# Patient Record
Sex: FEMALE | HISPANIC OR LATINO | Employment: FULL TIME | URBAN - METROPOLITAN AREA
[De-identification: names, ages, dates, MRNs, and addresses within clinical notes are randomized per-mention and may not be internally consistent; named-entity substitution may affect disease eponyms.]

---

## 2022-07-21 ENCOUNTER — HOSPITAL ENCOUNTER (EMERGENCY)
Facility: HOSPITAL | Age: 42
Discharge: HOME | End: 2022-07-22
Attending: EMERGENCY MEDICINE | Admitting: EMERGENCY MEDICINE

## 2022-07-21 VITALS
RESPIRATION RATE: 15 BRPM | DIASTOLIC BLOOD PRESSURE: 79 MMHG | WEIGHT: 208.11 LBS | HEART RATE: 80 BPM | OXYGEN SATURATION: 100 % | TEMPERATURE: 99 F | SYSTOLIC BLOOD PRESSURE: 124 MMHG

## 2022-07-21 DIAGNOSIS — N89.8 VAGINAL DISCHARGE: ICD-10-CM

## 2022-07-21 DIAGNOSIS — D64.9 ANEMIA: Primary | ICD-10-CM

## 2022-07-21 LAB
ALBUMIN SERPL BCP-MCNC: 4 G/DL (ref 3.5–5)
ALP SERPL-CCNC: 76 U/L (ref 43–122)
ALT SERPL W P-5'-P-CCNC: 18 U/L
ANION GAP SERPL CALCULATED.3IONS-SCNC: 10 MMOL/L (ref 5–14)
AST SERPL W P-5'-P-CCNC: 22 U/L (ref 14–36)
BACTERIA UR QL AUTO: ABNORMAL /HPF
BASOPHILS # BLD AUTO: 0.03 THOUSANDS/ΜL (ref 0–0.1)
BASOPHILS NFR BLD AUTO: 0 % (ref 0–1)
BILIRUB SERPL-MCNC: 0.28 MG/DL (ref 0.2–1)
BILIRUB UR QL STRIP: NEGATIVE
BUN SERPL-MCNC: 14 MG/DL (ref 5–25)
CALCIUM SERPL-MCNC: 8.7 MG/DL (ref 8.4–10.2)
CHLORIDE SERPL-SCNC: 104 MMOL/L (ref 96–108)
CLARITY UR: ABNORMAL
CO2 SERPL-SCNC: 23 MMOL/L (ref 21–32)
COLOR UR: ABNORMAL
CREAT SERPL-MCNC: 0.64 MG/DL (ref 0.6–1.2)
EOSINOPHIL # BLD AUTO: 0.06 THOUSAND/ΜL (ref 0–0.61)
EOSINOPHIL NFR BLD AUTO: 1 % (ref 0–6)
ERYTHROCYTE [DISTWIDTH] IN BLOOD BY AUTOMATED COUNT: 21.2 % (ref 11.6–15.1)
EXT PREG TEST URINE: NEGATIVE
EXT. CONTROL ED NAV: NORMAL
GFR SERPL CREATININE-BSD FRML MDRD: 112 ML/MIN/1.73SQ M
GLUCOSE SERPL-MCNC: 120 MG/DL (ref 70–99)
GLUCOSE UR STRIP-MCNC: NEGATIVE MG/DL
HCT VFR BLD AUTO: 27.5 % (ref 34.8–46.1)
HGB BLD-MCNC: 7.5 G/DL (ref 11.5–15.4)
HGB UR QL STRIP.AUTO: NEGATIVE
IMM GRANULOCYTES # BLD AUTO: 0.04 THOUSAND/UL (ref 0–0.2)
IMM GRANULOCYTES NFR BLD AUTO: 0 % (ref 0–2)
KETONES UR STRIP-MCNC: NEGATIVE MG/DL
LEUKOCYTE ESTERASE UR QL STRIP: 100
LIPASE SERPL-CCNC: 139 U/L (ref 23–300)
LYMPHOCYTES # BLD AUTO: 2.51 THOUSANDS/ΜL (ref 0.6–4.47)
LYMPHOCYTES NFR BLD AUTO: 25 % (ref 14–44)
MCH RBC QN AUTO: 18.2 PG (ref 26.8–34.3)
MCHC RBC AUTO-ENTMCNC: 27.3 G/DL (ref 31.4–37.4)
MCV RBC AUTO: 67 FL (ref 82–98)
MONOCYTES # BLD AUTO: 0.84 THOUSAND/ΜL (ref 0.17–1.22)
MONOCYTES NFR BLD AUTO: 9 % (ref 4–12)
MUCOUS THREADS UR QL AUTO: ABNORMAL
NEUTROPHILS # BLD AUTO: 6.41 THOUSANDS/ΜL (ref 1.85–7.62)
NEUTS SEG NFR BLD AUTO: 65 % (ref 43–75)
NITRITE UR QL STRIP: NEGATIVE
NON-SQ EPI CELLS URNS QL MICRO: ABNORMAL /HPF
NRBC BLD AUTO-RTO: 0 /100 WBCS
PH UR STRIP.AUTO: 6.5 [PH]
PLATELET # BLD AUTO: 338 THOUSANDS/UL (ref 149–390)
PMV BLD AUTO: 10.1 FL (ref 8.9–12.7)
POTASSIUM SERPL-SCNC: 3.5 MMOL/L (ref 3.5–5.3)
PROT SERPL-MCNC: 7.6 G/DL (ref 6.4–8.4)
PROT UR STRIP-MCNC: ABNORMAL MG/DL
RBC # BLD AUTO: 4.13 MILLION/UL (ref 3.81–5.12)
RBC #/AREA URNS AUTO: ABNORMAL /HPF
SODIUM SERPL-SCNC: 137 MMOL/L (ref 135–147)
SP GR UR STRIP.AUTO: 1.02 (ref 1–1.04)
UROBILINOGEN UA: NEGATIVE MG/DL
WBC # BLD AUTO: 9.89 THOUSAND/UL (ref 4.31–10.16)
WBC #/AREA URNS AUTO: ABNORMAL /HPF

## 2022-07-21 PROCEDURE — 87660 TRICHOMONAS VAGIN DIR PROBE: CPT | Performed by: EMERGENCY MEDICINE

## 2022-07-21 PROCEDURE — 99284 EMERGENCY DEPT VISIT MOD MDM: CPT | Performed by: EMERGENCY MEDICINE

## 2022-07-21 PROCEDURE — 81025 URINE PREGNANCY TEST: CPT | Performed by: EMERGENCY MEDICINE

## 2022-07-21 PROCEDURE — 80053 COMPREHEN METABOLIC PANEL: CPT | Performed by: EMERGENCY MEDICINE

## 2022-07-21 PROCEDURE — 99284 EMERGENCY DEPT VISIT MOD MDM: CPT

## 2022-07-21 PROCEDURE — 87563 M. GENITALIUM AMP PROBE: CPT | Performed by: EMERGENCY MEDICINE

## 2022-07-21 PROCEDURE — 87510 GARDNER VAG DNA DIR PROBE: CPT | Performed by: EMERGENCY MEDICINE

## 2022-07-21 PROCEDURE — 96361 HYDRATE IV INFUSION ADD-ON: CPT

## 2022-07-21 PROCEDURE — 87480 CANDIDA DNA DIR PROBE: CPT | Performed by: EMERGENCY MEDICINE

## 2022-07-21 PROCEDURE — 83690 ASSAY OF LIPASE: CPT | Performed by: EMERGENCY MEDICINE

## 2022-07-21 PROCEDURE — 87661 TRICHOMONAS VAGINALIS AMPLIF: CPT | Performed by: EMERGENCY MEDICINE

## 2022-07-21 PROCEDURE — 85025 COMPLETE CBC W/AUTO DIFF WBC: CPT | Performed by: EMERGENCY MEDICINE

## 2022-07-21 PROCEDURE — 96374 THER/PROPH/DIAG INJ IV PUSH: CPT

## 2022-07-21 PROCEDURE — 36415 COLL VENOUS BLD VENIPUNCTURE: CPT | Performed by: EMERGENCY MEDICINE

## 2022-07-21 PROCEDURE — 81001 URINALYSIS AUTO W/SCOPE: CPT | Performed by: EMERGENCY MEDICINE

## 2022-07-21 RX ORDER — KETOROLAC TROMETHAMINE 30 MG/ML
15 INJECTION, SOLUTION INTRAMUSCULAR; INTRAVENOUS ONCE
Status: COMPLETED | OUTPATIENT
Start: 2022-07-21 | End: 2022-07-21

## 2022-07-21 RX ADMIN — SODIUM CHLORIDE 1000 ML: 0.9 INJECTION, SOLUTION INTRAVENOUS at 22:47

## 2022-07-21 RX ADMIN — KETOROLAC TROMETHAMINE 15 MG: 30 INJECTION, SOLUTION INTRAMUSCULAR; INTRAVENOUS at 22:48

## 2022-07-22 ENCOUNTER — APPOINTMENT (EMERGENCY)
Dept: CT IMAGING | Facility: HOSPITAL | Age: 42
End: 2022-07-22

## 2022-07-22 LAB
C TRACH DNA SPEC QL NAA+PROBE: NEGATIVE
M GENITALIUM DNA SPEC QL NAA+PROBE: NEGATIVE
M GENITALIUM DNA SPEC QL NAA+PROBE: NEGATIVE
N GONORRHOEA DNA SPEC QL NAA+PROBE: NEGATIVE
T VAGINALIS DNA SPEC QL NAA+PROBE: NEGATIVE
T VAGINALIS DNA SPEC QL NAA+PROBE: NEGATIVE

## 2022-07-22 PROCEDURE — 87563 M. GENITALIUM AMP PROBE: CPT | Performed by: EMERGENCY MEDICINE

## 2022-07-22 PROCEDURE — 87661 TRICHOMONAS VAGINALIS AMPLIF: CPT | Performed by: EMERGENCY MEDICINE

## 2022-07-22 PROCEDURE — 87591 N.GONORRHOEAE DNA AMP PROB: CPT | Performed by: EMERGENCY MEDICINE

## 2022-07-22 PROCEDURE — 87491 CHLMYD TRACH DNA AMP PROBE: CPT | Performed by: EMERGENCY MEDICINE

## 2022-07-22 PROCEDURE — 74177 CT ABD & PELVIS W/CONTRAST: CPT

## 2022-07-22 PROCEDURE — 96361 HYDRATE IV INFUSION ADD-ON: CPT

## 2022-07-22 PROCEDURE — G1004 CDSM NDSC: HCPCS

## 2022-07-22 RX ORDER — METRONIDAZOLE 500 MG/1
500 TABLET ORAL ONCE
Status: COMPLETED | OUTPATIENT
Start: 2022-07-22 | End: 2022-07-22

## 2022-07-22 RX ORDER — CLOTRIMAZOLE 1 %
7 CREAM WITH APPLICATOR VAGINAL
Qty: 45 G | Refills: 0 | Status: SHIPPED | OUTPATIENT
Start: 2022-07-22 | End: 2022-07-29

## 2022-07-22 RX ORDER — METRONIDAZOLE 500 MG/1
500 TABLET ORAL EVERY 8 HOURS SCHEDULED
Qty: 21 TABLET | Refills: 0 | Status: SHIPPED | OUTPATIENT
Start: 2022-07-22 | End: 2022-07-29

## 2022-07-22 RX ADMIN — IOHEXOL 68 ML: 350 INJECTION, SOLUTION INTRAVENOUS at 00:29

## 2022-07-22 RX ADMIN — METRONIDAZOLE 500 MG: 500 TABLET ORAL at 01:38

## 2022-07-22 NOTE — ED PROVIDER NOTES
History  Chief Complaint   Patient presents with    Rectal Pain     Patient reports sudden 10/10 rectal pain that caused her to become dizzy and vomit  States she had last BM two days ago  Reports she consumes Alejandrina Pouch regularly due to abdominal discomfort  HPI     47 yo F otherwise healthy presents to ed for eval of lower abdominal pain and rectal pain  She has had this pain     Onset: one week constant, started in her lower abdomen  Duration: intermittent, constant since last night  Pain currently: 10/10  Pain meds taken: pepto  Prior similar pain: did have similar pain 4 years ago, was diagnosed for colitis  Where: lower abd  Radiation: no  Associated N/V: no  Associated with food: no  Emesis: NBNB  Last BM: 2 days ago; stool was dark but from pepto, no blood  Urinary complaints:  No    Has hx of hemorrhoids, no blood with wiping, doesn't feel currently  LMP July 14  Vaginal Discharge: One week of itching, yellow discharge, no foul odor    Prior Surgeries tubal ligation    Previous Imaging none    No other complaints on ros  Just moved here from [place] 5 months ago    None       History reviewed  No pertinent past medical history  History reviewed  No pertinent surgical history  History reviewed  No pertinent family history  I have reviewed and agree with the history as documented  E-Cigarette/Vaping     E-Cigarette/Vaping Substances     Social History     Tobacco Use    Smoking status: Never Smoker    Smokeless tobacco: Never Used   Substance Use Topics    Alcohol use: Not Currently    Drug use: Never       Review of Systems   Constitutional: Negative for chills, fatigue and fever  HENT: Negative for sore throat  Eyes: Negative for redness and visual disturbance  Respiratory: Negative for cough and shortness of breath  Cardiovascular: Negative for chest pain  Gastrointestinal: Positive for abdominal pain and rectal pain  Negative for diarrhea and nausea  Genitourinary: Negative for difficulty urinating, dysuria and pelvic pain  Musculoskeletal: Negative for back pain  Skin: Negative for rash  Neurological: Negative for syncope, weakness and headaches  All other systems reviewed and are negative  Physical Exam  Physical Exam  Vitals and nursing note reviewed  Constitutional:       General: She is not in acute distress  HENT:      Head: Normocephalic and atraumatic  Eyes:      Conjunctiva/sclera: Conjunctivae normal    Cardiovascular:      Rate and Rhythm: Normal rate and regular rhythm  Heart sounds: Normal heart sounds  Pulmonary:      Effort: Pulmonary effort is normal  No respiratory distress  Breath sounds: Normal breath sounds  Abdominal:      General: Bowel sounds are normal       Palpations: Abdomen is soft  Tenderness: There is abdominal tenderness  Comments: Suprapubic discomfort   Genitourinary:     Rectum: Normal       Comments: No hemorrhoids palpated  No bleeding from rectum    Speculum exam shows no adnexal tenderness  Erythematous cervix, thick white discharge as well  Musculoskeletal:         General: Normal range of motion  Cervical back: Normal range of motion  Skin:     General: Skin is warm and dry  Findings: No rash  Neurological:      Mental Status: She is alert and oriented to person, place, and time  Cranial Nerves: No cranial nerve deficit  Sensory: No sensory deficit  Motor: No abnormal muscle tone        Coordination: Coordination normal          Vital Signs  ED Triage Vitals [07/21/22 2158]   Temperature Pulse Respirations Blood Pressure SpO2   99 °F (37 2 °C) 80 15 124/79 100 %      Temp Source Heart Rate Source Patient Position - Orthostatic VS BP Location FiO2 (%)   Oral Monitor Lying Left arm --      Pain Score       --           Vitals:    07/21/22 2158   BP: 124/79   Pulse: 80   Patient Position - Orthostatic VS: Lying         Visual Acuity      ED Medications  Medications   sodium chloride 0 9 % bolus 1,000 mL (0 mL Intravenous Stopped 7/22/22 0138)   ketorolac (TORADOL) injection 15 mg (15 mg Intravenous Given 7/21/22 2248)   iohexol (OMNIPAQUE) 350 MG/ML injection (SINGLE-DOSE) 68 mL (68 mL Intravenous Given 7/22/22 0029)   metroNIDAZOLE (FLAGYL) tablet 500 mg (500 mg Oral Given 7/22/22 0138)       Diagnostic Studies  Results Reviewed     Procedure Component Value Units Date/Time    Trichomonas vaginalis/Mycoplasma genitalium PCR [[id number]] Collected: 07/22/22 0008    Lab Status: In process Specimen: Urine, Voided Updated: 07/22/22 0012    Chlamydia/GC amplified DNA by PCR [[id number]] Collected: 07/22/22 0008    Lab Status: In process Specimen: Urine, Other Updated: 07/22/22 0012    VAGINOSIS DNA PROBE (AFFIRM) [[id number]] Collected: 07/21/22 2343    Lab Status: In process Specimen: Genital from Vaginal Updated: 07/21/22 2353    Trichomonas vaginalis/Mycoplasma genitalium PCR [[id number]] Collected: 07/21/22 2343    Lab Status:  In process Specimen: Genital from Vaginal Updated: 07/21/22 2353    Urine Microscopic [[id number]]  (Abnormal) Collected: 07/21/22 2232    Lab Status: Final result Specimen: Urine, Other Updated: 07/21/22 2314     RBC, UA None Seen /hpf      WBC, UA 10-20 /hpf      Epithelial Cells Moderate /hpf      Bacteria, UA Moderate /hpf      MUCUS THREADS Occasional    Lipase [[id number]]  (Normal) Collected: 07/21/22 2248    Lab Status: Final result Specimen: Blood from Arm, Right Updated: 07/21/22 2313     Lipase 139 u/L     Comprehensive metabolic panel [[id number]]  (Abnormal) Collected: 07/21/22 2248    Lab Status: Final result Specimen: Blood from Arm, Right Updated: 07/21/22 2312     Sodium 137 mmol/L      Potassium 3 5 mmol/L      Chloride 104 mmol/L      CO2 23 mmol/L      ANION GAP 10 mmol/L      BUN 14 mg/dL      Creatinine 0 64 mg/dL      Glucose 120 mg/dL      Calcium 8 7 mg/dL      AST 22 U/L      ALT 18 U/L      Alkaline Phosphatase 76 U/L      Total Protein 7 6 g/dL      Albumin 4 0 g/dL      Total Bilirubin 0 28 mg/dL      eGFR 112 ml/min/1 73sq m     Narrative:      [place] guidelines for Chronic Kidney Disease (CKD):     Stage 1 with normal or high GFR (GFR > 90 mL/min/1 73 square meters)    Stage 2 Mild CKD (GFR = 60-89 mL/min/1 73 square meters)    Stage 3A Moderate CKD (GFR = 45-59 mL/min/1 73 square meters)    Stage 3B Moderate CKD (GFR = 30-44 mL/min/1 73 square meters)    Stage 4 Severe CKD (GFR = 15-29 mL/min/1 73 square meters)    Stage 5 End Stage CKD (GFR <15 mL/min/1 73 square meters)  Note: GFR calculation is accurate only with a steady state creatinine    CBC and differential [[id number]]  (Abnormal) Collected: 07/21/22 2248    Lab Status: Final result Specimen: Blood from Arm, Right Updated: 07/21/22 2257     WBC 9 89 Thousand/uL      RBC 4 13 Million/uL      Hemoglobin 7 5 g/dL      Hematocrit 27 5 %      MCV 67 fL      MCH 18 2 pg      MCHC 27 3 g/dL      RDW 21 2 %      MPV 10 1 fL      Platelets 841 Thousands/uL      nRBC 0 /100 WBCs      Neutrophils Relative 65 %      Immat GRANS % 0 %      Lymphocytes Relative 25 %      Monocytes Relative 9 %      Eosinophils Relative 1 %      Basophils Relative 0 %      Neutrophils Absolute 6 41 Thousands/µL      Immature Grans Absolute 0 04 Thousand/uL      Lymphocytes Absolute 2 51 Thousands/µL      Monocytes Absolute 0 84 Thousand/µL      Eosinophils Absolute 0 06 Thousand/µL      Basophils Absolute 0 03 Thousands/µL     UA (URINE) with reflex to Scope [[id number]]  (Abnormal) Collected: 07/21/22 2232    Lab Status: Final result Specimen: Urine, Other Updated: 07/21/22 2248     Color, UA Evelyn     Clarity, UA Slightly Cloudy     Specific [place], UA 1 020     pH, UA 6 5     Leukocytes,  0     Nitrite, UA Negative     Protein, UA 30 (1+) mg/dl      Glucose, UA Negative mg/dl      Ketones, UA Negative mg/dl      Bilirubin, UA Negative Occult Blood, UA Negative     UROBILINOGEN UA Negative mg/dL     POCT pregnancy, urine [[id number]]  (Normal) Resulted: 07/21/22 2233    Lab Status: Final result Updated: 07/21/22 2233     EXT PREG TEST UR (Ref: Negative) negative     Control valid                 CT abdomen pelvis with contrast   Final Result by Mario Alberto Daugherty MD (07/22 7176)      No acute inflammatory process identified within the abdomen or pelvis  Workstation performed: [id number]                    Procedures  Procedures         ED Course  ED Course as of 07/22/22 0553   Thu Jul 21, 2022   2358 Hemoglobin(!): 7 5           MDM     Labs looking for any acute changes to wbc count or any acute electrolyte abnormalities  Imaging for any acute pathology  Symptomatic treatments provided  Does have pelvic discharge, thick white  Rectal exam shows no hemorrhoids  Imaging shows no acute findings  Treat for candida and trich, obgyn follow up  Follow up for asymptomatic anemia  No prior baseline  Gi follow up for rectal pain  The patient was instructed to follow up as documented  Strict return precautions were discussed with the patient and the patient was instructed to return to the emergency department immediately if symptoms worsen  The patient/patient family member acknowledged and were in agreement with plan  Disposition  Final diagnoses:   Anemia   Vaginal discharge     Time reflects when diagnosis was documented in both MDM as applicable and the Disposition within this note     Time User Action Codes Description Comment    7/22/2022  1:27 AM Julianne Forward Add [D64 9] Anemia     7/22/2022  1:33 AM Julianne Forward Add [N89 8] Vaginal discharge       ED Disposition     ED Disposition   Discharge    Condition   Stable    Date/Time   Fri Jul 22, 2022  1:27 AM    Comment   Mozelle Salm MurilloReyes discharge to home/self care                 Follow-up Information     Follow up With Specialties Details Why Contact Info Additional [address] [facility] Schedule an appointment as soon as possible for a visit today For follow up regarding your anemia [address] [facility] [address]  [address] [facility] [address], [address] [place], [place], [address] [facility] Gastroenterology Schedule an appointment as soon as possible for a visit today For follow up regarding your abdominal pain [address] [address]  [phone] [address] [facility], [address]  [place], [place] [address]        [address] [facility] Schedule an appointment as soon as possible for a visit today For follow up regarding your vaginal discharge [address] [facility] [address] [facility] [address]  [address], [address], [address] [place], [place], [address] [phone]          Discharge Medication List as of 7/22/2022  1:34 AM      START taking these medications    Details   clotrimazole (GYNE-LOTRIMIN) 1 % vaginal cream Insert 7 applicators into the vagina daily at bedtime for 7 days, Starting Fri 7/22/2022, Until Fri 7/29/2022, Print      metroNIDAZOLE (FLAGYL) 500 mg tablet Take 1 tablet (500 mg total) by mouth every 8 (eight) hours for 7 days, Starting Fri 7/22/2022, Until Fri 7/29/2022, Print             No discharge procedures on file      PDMP Review     None          ED Provider  Electronically Signed by           Ariel Barragan MD  07/22/22 8678

## 2022-07-23 LAB
CANDIDA RRNA VAG QL PROBE: POSITIVE
G VAGINALIS RRNA GENITAL QL PROBE: NEGATIVE
T VAGINALIS RRNA GENITAL QL PROBE: NEGATIVE

## 2022-07-27 ENCOUNTER — OFFICE VISIT (OUTPATIENT)
Dept: OBGYN CLINIC | Facility: CLINIC | Age: 42
End: 2022-07-27

## 2022-07-27 VITALS — DIASTOLIC BLOOD PRESSURE: 72 MMHG | WEIGHT: 211.4 LBS | SYSTOLIC BLOOD PRESSURE: 106 MMHG | HEART RATE: 83 BPM

## 2022-07-27 DIAGNOSIS — N89.8 VAGINAL DISCHARGE: Primary | ICD-10-CM

## 2022-07-27 PROCEDURE — 99202 OFFICE O/P NEW SF 15 MIN: CPT | Performed by: OBSTETRICS & GYNECOLOGY

## 2022-07-27 NOTE — PROGRESS NOTES
Assessment/Plan:     No problem-specific Assessment & Plan notes found for this encounter  Diagnoses and all orders for this visit:    Vaginal discharge    Current medication regime helping  RTO for annual exam            Subjective:  Brandan Lea #[id number]     Patient ID: Brando Ortiz is a 46 y o  female who presents as a follow up to the ED  Testing for GC/CT/trich and affirm testing  Only yeast was positive  FINDINGS:     ABDOMEN     LOWER CHEST:  No clinically significant abnormality identified in the visualized lower chest      LIVER/BILIARY TREE:  Unremarkable      GALLBLADDER:  No calcified gallstones  No pericholecystic inflammatory change      SPLEEN:  Unremarkable      PANCREAS:  Unremarkable      ADRENAL GLANDS:  Unremarkable      KIDNEYS/URETERS:  Unremarkable  No hydronephrosis      STOMACH AND BOWEL:  No bowel obstruction  No significant bowel wall thickening      APPENDIX:  A normal appendix was visualized      ABDOMINOPELVIC CAVITY:  No ascites  No pneumoperitoneum  No lymphadenopathy      VESSELS:  Unremarkable for patient's age      PELVIS     REPRODUCTIVE ORGANS:  Unremarkable for patient's age      URINARY BLADDER:  Unremarkable      ABDOMINAL WALL/INGUINAL REGIONS:  No perirectal abscess is seen      OSSEOUS STRUCTURES:  No acute fracture or destructive osseous lesion      IMPRESSION:     No acute inflammatory process identified within the abdomen or pelvis  She has been here about 6 months  In the past she has had US in [facility] which showed fibroids  CT as above  She reports her periods have been heavier  This will require work up in the future       HPI    The following portions of the patient's history were reviewed and updated as appropriate: allergies, current medications, past family history, past medical history, past social history, past surgical history and problem list     Review of Systems      Objective:      /72   Pulse 83   Wt 95 9 kg (211 lb 6 4 oz)          Physical Exam  Vitals and nursing note reviewed  Constitutional:       Appearance: Normal appearance  Pulmonary:      Effort: Pulmonary effort is normal    Neurological:      General: No focal deficit present  Mental Status: She is oriented to person, place, and time     Psychiatric:         Mood and Affect: Mood normal          Behavior: Behavior normal

## 2022-08-03 ENCOUNTER — CONSULT (OUTPATIENT)
Dept: GASTROENTEROLOGY | Facility: MEDICAL CENTER | Age: 42
End: 2022-08-03

## 2022-08-03 VITALS
BODY MASS INDEX: 35.02 KG/M2 | HEIGHT: 65 IN | HEART RATE: 88 BPM | SYSTOLIC BLOOD PRESSURE: 120 MMHG | TEMPERATURE: 98.5 F | WEIGHT: 210.2 LBS | DIASTOLIC BLOOD PRESSURE: 62 MMHG | OXYGEN SATURATION: 99 %

## 2022-08-03 DIAGNOSIS — R10.13 EPIGASTRIC PAIN: ICD-10-CM

## 2022-08-03 DIAGNOSIS — D64.9 ANEMIA, UNSPECIFIED TYPE: Primary | ICD-10-CM

## 2022-08-03 DIAGNOSIS — A04.8 H. PYLORI INFECTION: ICD-10-CM

## 2022-08-03 PROCEDURE — 99244 OFF/OP CNSLTJ NEW/EST MOD 40: CPT | Performed by: STUDENT IN AN ORGANIZED HEALTH CARE EDUCATION/TRAINING PROGRAM

## 2022-08-03 RX ORDER — PANTOPRAZOLE SODIUM 40 MG/1
40 TABLET, DELAYED RELEASE ORAL DAILY
Qty: 30 TABLET | Refills: 11 | Status: SHIPPED | OUTPATIENT
Start: 2022-08-03

## 2022-08-03 NOTE — PROGRESS NOTES
[facility] - Outpatient Consultation  Eli Gold MurilloReyes 46 y o  female MRN: [id number]  Encounter: [id number]          ASSESSMENT AND PLAN:    46 healthy female referred after ED visit for lower abdominal pain, found to have microcytic anemia  Today, she is describing more of post prandial epigastric pain and bloating  Does have history of H pylori, unclear if prior treatment was successful  Suspicious for peptic process and menstrual blood loss but does warrant bidirectional endoscopy to rule out malignancy, PUD, polyps, celiac, etc  1  Anemia, unspecified type  2  Epigastric pain  3  H  pylori infection  - Ferritin; Future  - Colonoscopy; Future  - EGD; Future  - pantoprazole (PROTONIX) 40 mg tablet; Take 1 tablet (40 mg total) by mouth daily  Dispense: 30 tablet; Refill: 11  - Patient currently uninsured and reports she cannot pay out of pocket for procedures  Will try empiric treatment with PPI and check H pylori stool antigen while she is applying for medical assistance/financial assistance       ______________________________________________________________________    Dania Emerson to ED with abdominal pain 7/21  Pain has been getting better but gets significant bloating after eating anything  Stomach feels wrong when eating  Has lost 13# over 6 months  Was told she was anemic last November  Periods had been heavy but for past 2 months were normal   No blood in stool  Had EGD 2 years ago and was told she had H pylori  Took antibiotics, did not have confirmation of erradication  Takes tylenol  No NSAID use  No alcohol or tobacco     No family history of GI malginancy      REVIEW OF SYSTEMS:    CONSTITUTIONAL: Denies any fever, chills, rigors, and weight loss  HEENT: No earache or tinnitus  Denies hearing loss or visual disturbances  CARDIOVASCULAR: No chest pain or palpitations     RESPIRATORY: Denies any cough, hemoptysis, shortness of breath or dyspnea on exertion  GASTROINTESTINAL: As noted in the History of Present Illness  GENITOURINARY: No problems with urination  Denies any hematuria or dysuria  NEUROLOGIC: No dizziness or vertigo, denies headaches  MUSCULOSKELETAL: Denies any muscle or joint pain  SKIN: Denies skin rashes or itching  ENDOCRINE: Denies excessive thirst  Denies intolerance to heat or cold  PSYCHOSOCIAL: Denies depression or anxiety  Denies any recent memory loss  Historical Information   History reviewed  No pertinent past medical history  Denies chronic medical problems  History reviewed  No pertinent surgical history  s/p tubal ligation  Social History   Social History     Substance and Sexual Activity   Alcohol Use Not Currently     Social History     Substance and Sexual Activity   Drug Use Never     Social History     Tobacco Use   Smoking Status Never Smoker   Smokeless Tobacco Never Used     History reviewed  No pertinent family history  Meds/Allergies     No current outpatient medications on file  Allergies   Allergen Reactions    Iron [Ferrous Sulfate] Abdominal Pain           Objective     Blood pressure 120/62, pulse 88, temperature 98 5 °F (36 9 °C), temperature source Tympanic, height 5' 5" (1 651 m), weight 95 3 kg (210 lb 3 2 oz), SpO2 99 %  Body mass index is 34 98 kg/m²  PHYSICAL EXAM:      General Appearance:   Alert, cooperative, no distress   HEENT:   Normocephalic, atraumatic, anicteric  Neck:  Supple, symmetrical, trachea midline   Lungs:   Clear to auscultation bilaterally; no rales, rhonchi or wheezing; respirations unlabored    Heart[de-identified]   Regular rate and rhythm; no murmur, rub, or gallop     Abdomen:   Obese, soft, epigastric TTP, non-distended; normal bowel sounds; no masses, no organomegaly    Genitalia:   Deferred    Rectal:   Deferred    Extremities:  No cyanosis, clubbing or edema    Pulses:  2+ and symmetric    Skin:  No jaundice, rashes, or lesions    Lymph nodes:  No palpable cervical lymphadenopathy        Lab Results:   No visits with results within 1 Day(s) from this visit     Latest known visit with results is:   Admission on 07/21/2022, Discharged on 07/22/2022   Component Date Value    WBC 07/21/2022 9 89     RBC 07/21/2022 4 13     Hemoglobin 07/21/2022 7 5 (A)    Hematocrit 07/21/2022 27 5 (A)    MCV 07/21/2022 67 (A)    MCH 07/21/2022 18 2 (A)    MCHC 07/21/2022 27 3 (A)    RDW 07/21/2022 21 2 (A)    MPV 07/21/2022 10 1     Platelets 66/69/1943 338     nRBC 07/21/2022 0     Neutrophils Relative 07/21/2022 65     Immat GRANS % 07/21/2022 0     Lymphocytes Relative 07/21/2022 25     Monocytes Relative 07/21/2022 9     Eosinophils Relative 07/21/2022 1     Basophils Relative 07/21/2022 0     Neutrophils Absolute 07/21/2022 6 41     Immature Grans Absolute 07/21/2022 0 04     Lymphocytes Absolute 07/21/2022 2 51     Monocytes Absolute 07/21/2022 0 84     Eosinophils Absolute 07/21/2022 0 06     Basophils Absolute 07/21/2022 0 03     Sodium 07/21/2022 137     Potassium 07/21/2022 3 5     Chloride 07/21/2022 104     CO2 07/21/2022 23     ANION GAP 07/21/2022 10     BUN 07/21/2022 14     Creatinine 07/21/2022 0 64     Glucose 07/21/2022 120 (A)    Calcium 07/21/2022 8 7     AST 07/21/2022 22     ALT 07/21/2022 18     Alkaline Phosphatase 07/21/2022 76     Total Protein 07/21/2022 7 6     Albumin 07/21/2022 4 0     Total Bilirubin 07/21/2022 0 28     eGFR 07/21/2022 112     Lipase 07/21/2022 139     Color, UA 07/21/2022 Evelyn (A)    Clarity, UA 07/21/2022 Slightly Cloudy (A)    Specific Gravity, UA 07/21/2022 1 020     pH, UA 07/21/2022 6 5     Leukocytes, UA 07/21/2022 100 0 (A)    Nitrite, UA 07/21/2022 Negative     Protein, UA 07/21/2022 30 (1+) (A)    Glucose, UA 07/21/2022 Negative     Ketones, UA 07/21/2022 Negative     Bilirubin, UA 07/21/2022 Negative     Occult Blood, UA 07/21/2022 Negative     UROBILINOGEN UA 07/21/2022 Negative     EXT PREG TEST UR (Ref: N* 07/21/2022 negative     Control 07/21/2022 valid     RBC, UA 07/21/2022 None Seen     WBC, UA 07/21/2022 10-20 (A)    Epithelial Cells 07/21/2022 Moderate (A)    Bacteria, UA 07/21/2022 Moderate (A)    MUCUS THREADS 07/21/2022 Occasional (A)    Candida Species 07/21/2022 Positive (A)    Gardnerella vaginalis 07/21/2022 Negative     Trichomonas vaginalis 07/21/2022 Negative     N gonorrhoeae, DNA Probe 07/22/2022 Negative     Chlamydia trachomatis, D* 07/22/2022 Negative     Trichomonas vaginalis 07/21/2022 Negative     Mycoplasma genitalium 07/21/2022 Negative     Trichomonas vaginalis 07/22/2022 Negative     Mycoplasma genitalium 07/22/2022 Negative          Radiology Results:   CT abdomen pelvis with contrast    Result Date: 7/22/2022  Narrative: CT ABDOMEN AND PELVIS WITH IV CONTRAST INDICATION:   Abdominal abscess/infection suspected concern for perirectal abscess vs colitis  COMPARISON:  None  TECHNIQUE:  CT examination of the abdomen and pelvis was performed  Axial, sagittal, and coronal 2D reformatted images were created from the source data and submitted for interpretation  Radiation dose length product (DLP) for this visit:  1280 mGy-cm   This examination, like all CT scans performed in the [facility], was performed utilizing techniques to minimize radiation dose exposure, including the use of iterative reconstruction and automated exposure control  IV Contrast:  68 mL of iohexol (OMNIPAQUE) Enteric Contrast:  Enteric contrast was not administered  FINDINGS: ABDOMEN LOWER CHEST:  No clinically significant abnormality identified in the visualized lower chest  LIVER/BILIARY TREE:  Unremarkable  GALLBLADDER:  No calcified gallstones  No pericholecystic inflammatory change  SPLEEN:  Unremarkable  PANCREAS:  Unremarkable  ADRENAL GLANDS:  Unremarkable  KIDNEYS/URETERS:  Unremarkable  No hydronephrosis   STOMACH AND BOWEL:  No bowel obstruction  No significant bowel wall thickening  APPENDIX:  A normal appendix was visualized  ABDOMINOPELVIC CAVITY:  No ascites  No pneumoperitoneum  No lymphadenopathy  VESSELS:  Unremarkable for patient's age  PELVIS REPRODUCTIVE ORGANS:  Unremarkable for patient's age  URINARY BLADDER:  Unremarkable  ABDOMINAL WALL/INGUINAL REGIONS:  No perirectal abscess is seen  OSSEOUS STRUCTURES:  No acute fracture or destructive osseous lesion  Impression: No acute inflammatory process identified within the abdomen or pelvis   Workstation performed: [id number]

## 2022-08-05 NOTE — PROGRESS NOTES
Assessment/Plan:    ED Follow Up (Candidal Infection):  Patient presents today for an ED follow up  Went to ED on 7/21/2022 with lower abdominal pain/rectal pain and found to have abnormal vaginal discharge and tested positive for Candidal infection  Was treated with a 7 day course of Clotrimazole cream as well as Flagyl TID for 7 days  CT abdomen/pelvis negative for an acute inflammatory process  Does have history of fibroids and has established care with OB/GYN  Symptoms have completely resolved  Patient denies fever, chest pain, abdominal pain, N/V/D, vaginal discharge today  Health Maintenance:   PHQ-2 Today: 2 (negative screen)    - COVID Vaccine: patient received 3 doses of her COVID vaccine   - HIV and Hepatitis C ordered  - Patient got her tetanus vaccine in December 2021  Asked her to bring vaccine records to our next visit  - Patient states she got a pap smear in November 2021 in the [place], which was negative  Patient asked to bring her results to the office to be scanned into the chart  Class 1 obesity due to excess calories with body mass index (BMI) of 34 0 to 34 9 in adult  - Lipid panel and A1C ordered  - Counseled on diet and exercise  Diagnoses and all orders for this visit:    Class 1 obesity due to excess calories with body mass index (BMI) of 34 0 to 34 9 in adult, unspecified whether serious comorbidity present  -     Lipid Panel with Direct LDL reflex; Future  -     Hemoglobin A1C; Future    Need for hepatitis C screening test  -     Hepatitis C Antibody ([facility]); Future    Screening for HIV (human immunodeficiency virus)  -     HIV 1/2 Antigen/Antibody (4th Generation) w Reflex [facility]; Future    Encounter for immunization  -     Cancel: TDAP VACCINE GREATER THAN OR EQUAL TO 8YO IM          Subjective:      Patient ID: Sravan Wood is a 46 y o  female      A pleasant 46year old female presents to the clinic today to establish care and for an ED follow up  She presented to the ED with lower abdominal pain as well as rectal pain   [id number] used for this visit  The following portions of the patient's history were reviewed and updated as appropriate: allergies, current medications, past family history, past medical history, past social history, past surgical history and problem list     Review of Systems   Constitutional: Negative for activity change, appetite change, chills and fever  HENT: Negative for sore throat  Respiratory: Negative for cough and shortness of breath  Cardiovascular: Negative for chest pain, palpitations and leg swelling  Gastrointestinal: Negative for abdominal pain, constipation, diarrhea, nausea and vomiting  Musculoskeletal: Negative for back pain and gait problem  Neurological: Negative for dizziness, seizures, weakness and headaches  Objective:      /68 (BP Location: Left arm, Patient Position: Sitting, Cuff Size: Adult)   Pulse 73   Temp (!) 97 3 °F (36 3 °C) (Temporal)   Resp 18   Ht 5' (1 524 m)   Wt 96 2 kg (212 lb)   LMP 08/05/2022   SpO2 97%   BMI 41 40 kg/m²        Physical Exam  Vitals reviewed  Constitutional:       General: She is not in acute distress  Appearance: She is well-developed  She is not diaphoretic  HENT:      Head: Normocephalic and atraumatic  Eyes:      Conjunctiva/sclera: Conjunctivae normal    Cardiovascular:      Rate and Rhythm: Normal rate and regular rhythm  Heart sounds: Normal heart sounds  No murmur heard  No friction rub  No gallop  Pulmonary:      Effort: Pulmonary effort is normal  No respiratory distress  Breath sounds: Normal breath sounds  No wheezing or rales  Abdominal:      General: Bowel sounds are normal  There is no distension  Palpations: Abdomen is soft  There is no mass  Tenderness: There is no abdominal tenderness  There is no guarding     Musculoskeletal:         General: No tenderness or deformity  Normal range of motion  Cervical back: Normal range of motion  Right lower leg: No edema  Left lower leg: No edema  Skin:     General: Skin is warm and dry  Neurological:      General: No focal deficit present  Mental Status: She is alert and oriented to person, place, and time             Soila Mcwilliams MD  8/8/2022

## 2022-08-08 ENCOUNTER — OFFICE VISIT (OUTPATIENT)
Dept: FAMILY MEDICINE CLINIC | Facility: CLINIC | Age: 42
End: 2022-08-08

## 2022-08-08 VITALS
BODY MASS INDEX: 41.62 KG/M2 | TEMPERATURE: 97.3 F | HEART RATE: 73 BPM | HEIGHT: 60 IN | DIASTOLIC BLOOD PRESSURE: 68 MMHG | SYSTOLIC BLOOD PRESSURE: 100 MMHG | RESPIRATION RATE: 18 BRPM | WEIGHT: 212 LBS | OXYGEN SATURATION: 97 %

## 2022-08-08 DIAGNOSIS — Z11.59 NEED FOR HEPATITIS C SCREENING TEST: ICD-10-CM

## 2022-08-08 DIAGNOSIS — E66.09 CLASS 1 OBESITY DUE TO EXCESS CALORIES WITH BODY MASS INDEX (BMI) OF 34.0 TO 34.9 IN ADULT, UNSPECIFIED WHETHER SERIOUS COMORBIDITY PRESENT: Primary | ICD-10-CM

## 2022-08-08 DIAGNOSIS — Z11.4 SCREENING FOR HIV (HUMAN IMMUNODEFICIENCY VIRUS): ICD-10-CM

## 2022-08-08 DIAGNOSIS — Z23 ENCOUNTER FOR IMMUNIZATION: ICD-10-CM

## 2022-08-08 PROBLEM — E66.811 CLASS 1 OBESITY DUE TO EXCESS CALORIES WITH BODY MASS INDEX (BMI) OF 34.0 TO 34.9 IN ADULT: Status: ACTIVE | Noted: 2022-08-08

## 2022-08-08 PROCEDURE — 99203 OFFICE O/P NEW LOW 30 MIN: CPT | Performed by: FAMILY MEDICINE

## 2022-08-09 ENCOUNTER — ANNUAL EXAM (OUTPATIENT)
Dept: OBGYN CLINIC | Facility: CLINIC | Age: 42
End: 2022-08-09

## 2022-08-09 VITALS
BODY MASS INDEX: 41.03 KG/M2 | WEIGHT: 209 LBS | HEART RATE: 80 BPM | HEIGHT: 60 IN | SYSTOLIC BLOOD PRESSURE: 111 MMHG | DIASTOLIC BLOOD PRESSURE: 72 MMHG

## 2022-08-09 DIAGNOSIS — D21.9 FIBROIDS: Primary | ICD-10-CM

## 2022-08-09 DIAGNOSIS — N92.0 MENORRHAGIA WITH REGULAR CYCLE: ICD-10-CM

## 2022-08-09 DIAGNOSIS — D50.9 IRON DEFICIENCY ANEMIA, UNSPECIFIED IRON DEFICIENCY ANEMIA TYPE: ICD-10-CM

## 2022-08-09 PROCEDURE — 99213 OFFICE O/P EST LOW 20 MIN: CPT | Performed by: NURSE PRACTITIONER

## 2022-08-09 NOTE — PATIENT INSTRUCTIONS
Schedule pelvic U/S  Return with OB/GYN resident to discuss results and a plan of care  Call with needs or concerns    COVID-19 Instructions    If you are having any of the following:  Cough   Shortness of breath   Fever  If traveled within past 2 weeks internationally or to high risk [place] states  Or been in contact with someone that has     Please call either:   Your PCP office  [phone], option [phone]    They will screen you over the phone and direct you to the nearest appropriate testing location    DO NOT go to your PCP or OB office without calling first       Aniceto Corado

## 2022-08-09 NOTE — PROGRESS NOTES
Assessment/Plan:         Diagnoses and all orders for this visit:    Fibroids    Iron deficiency anemia, unspecified iron deficiency anemia type      Plan  Schedule pelvic U/S  Return with OB/GYN resident to discuss results and a plan of care  Call with needs or concerns  Pt verbalized understanding of all discussed  Subjective:      Patient ID: Devon Tsai is a 46 y o  female  HPI   Pt presents with a Hx of Uterine Fibroids and heavy menses, pt states Fibroids were Dx in [facility]  Pt states she had her last GYN exam and PAP WNL  11/28/2021 in [facility]  Pt states in [facility] she was provided an iron infusion and was told she needed her uterus removed   Pt states she cannot take PO iron due to stomach upset  7/21/2022 H&H was 7 5/27 5  Pt states normally periods are 8-10 and heavy, pt states she has had VB for up to 28 days  Pt states in June and July her periods was only 4 days and light  Pt states she was not given medication to decrease her menstrual flow     The following portions of the patient's history were reviewed and updated as appropriate: allergies, current medications, past family history, past medical history, past social history, past surgical history and problem list     Review of Systems      Pertinent items are note in the HPI      Objective:      /72   Pulse 80   Ht 5' (1 524 m)   Wt 94 8 kg (209 lb)   LMP 08/05/2022   BMI 40 82 kg/m²          Physical Exam  Vitals reviewed  Constitutional:       Appearance: Normal appearance  Eyes:      General:         Right eye: No discharge  Left eye: No discharge  Pulmonary:      Effort: Pulmonary effort is normal  No respiratory distress  Musculoskeletal:         General: Normal range of motion  Cervical back: Normal range of motion  Skin:     General: Skin is warm and dry  Neurological:      Mental Status: She is alert and oriented to person, place, and time     Psychiatric:         Mood and Affect: Mood normal          Behavior: Behavior normal          Thought Content:  Thought content normal        Negative cough or SOB  Vulva negative erythema or lesions  Vagina normal mucosa  Cervix negative lesions, negative CMT  Uterus NT, negative masses palpable, palpation was limited by body habitus  Adnexa negative masses palpable

## 2022-08-10 ENCOUNTER — TELEPHONE (OUTPATIENT)
Dept: OBGYN CLINIC | Facility: CLINIC | Age: 42
End: 2022-08-10

## 2022-08-11 ENCOUNTER — TELEPHONE (OUTPATIENT)
Dept: OBGYN CLINIC | Facility: CLINIC | Age: 42
End: 2022-08-11

## 2022-08-12 ENCOUNTER — HOSPITAL ENCOUNTER (OUTPATIENT)
Dept: ULTRASOUND IMAGING | Facility: HOSPITAL | Age: 42
Discharge: HOME | End: 2022-08-12

## 2022-08-12 DIAGNOSIS — D21.9 FIBROIDS: ICD-10-CM

## 2022-08-12 PROCEDURE — 76856 US EXAM PELVIC COMPLETE: CPT

## 2022-08-12 PROCEDURE — 76830 TRANSVAGINAL US NON-OB: CPT

## 2022-08-19 ENCOUNTER — APPOINTMENT (OUTPATIENT)
Dept: LAB | Facility: HOSPITAL | Age: 42
End: 2022-08-19

## 2022-08-19 DIAGNOSIS — Z11.59 NEED FOR HEPATITIS C SCREENING TEST: ICD-10-CM

## 2022-08-19 DIAGNOSIS — Z11.4 SCREENING FOR HIV (HUMAN IMMUNODEFICIENCY VIRUS): ICD-10-CM

## 2022-08-19 DIAGNOSIS — E66.09 CLASS 1 OBESITY DUE TO EXCESS CALORIES WITH BODY MASS INDEX (BMI) OF 34.0 TO 34.9 IN ADULT, UNSPECIFIED WHETHER SERIOUS COMORBIDITY PRESENT: ICD-10-CM

## 2022-08-19 LAB
CHOLEST SERPL-MCNC: 240 MG/DL
EST. AVERAGE GLUCOSE BLD GHB EST-MCNC: 114 MG/DL
HBA1C MFR BLD: 5.6 %
HDLC SERPL-MCNC: 46 MG/DL
LDLC SERPL CALC-MCNC: 173 MG/DL
TRIGL SERPL-MCNC: 106 MG/DL

## 2022-08-19 PROCEDURE — 80061 LIPID PANEL: CPT

## 2022-08-19 PROCEDURE — 86803 HEPATITIS C AB TEST: CPT

## 2022-08-19 PROCEDURE — 87389 HIV-1 AG W/HIV-1&-2 AB AG IA: CPT

## 2022-08-19 PROCEDURE — 36415 COLL VENOUS BLD VENIPUNCTURE: CPT

## 2022-08-19 PROCEDURE — 83036 HEMOGLOBIN GLYCOSYLATED A1C: CPT

## 2022-08-20 LAB — HCV AB SER QL: NORMAL

## 2022-08-21 LAB — HIV 1+2 AB+HIV1 P24 AG SERPL QL IA: NORMAL

## 2022-08-25 ENCOUNTER — OFFICE VISIT (OUTPATIENT)
Dept: OBGYN CLINIC | Facility: CLINIC | Age: 42
End: 2022-08-25

## 2022-08-25 VITALS
HEART RATE: 76 BPM | SYSTOLIC BLOOD PRESSURE: 113 MMHG | WEIGHT: 209 LBS | DIASTOLIC BLOOD PRESSURE: 77 MMHG | BODY MASS INDEX: 40.82 KG/M2

## 2022-08-25 DIAGNOSIS — B37.9 YEAST INFECTION: ICD-10-CM

## 2022-08-25 DIAGNOSIS — N93.9 ABNORMAL UTERINE BLEEDING (AUB): Primary | ICD-10-CM

## 2022-08-25 PROCEDURE — 88341 IMHCHEM/IMCYTCHM EA ADD ANTB: CPT | Performed by: STUDENT IN AN ORGANIZED HEALTH CARE EDUCATION/TRAINING PROGRAM

## 2022-08-25 PROCEDURE — 99214 OFFICE O/P EST MOD 30 MIN: CPT | Performed by: NURSE PRACTITIONER

## 2022-08-25 PROCEDURE — 88342 IMHCHEM/IMCYTCHM 1ST ANTB: CPT | Performed by: STUDENT IN AN ORGANIZED HEALTH CARE EDUCATION/TRAINING PROGRAM

## 2022-08-25 PROCEDURE — 88312 SPECIAL STAINS GROUP 1: CPT | Performed by: STUDENT IN AN ORGANIZED HEALTH CARE EDUCATION/TRAINING PROGRAM

## 2022-08-25 PROCEDURE — 58100 BIOPSY OF UTERUS LINING: CPT | Performed by: OBSTETRICS & GYNECOLOGY

## 2022-08-25 PROCEDURE — 88305 TISSUE EXAM BY PATHOLOGIST: CPT | Performed by: STUDENT IN AN ORGANIZED HEALTH CARE EDUCATION/TRAINING PROGRAM

## 2022-08-25 RX ORDER — FLUCONAZOLE 150 MG/1
150 TABLET ORAL ONCE
Qty: 1 TABLET | Refills: 0 | Status: SHIPPED | OUTPATIENT
Start: 2022-08-25 | End: 2022-08-25

## 2022-08-25 NOTE — PROGRESS NOTES
Subjective:     Ken Smith is a 46 y o  here for consultation after presenting to her annual exam is concerns for AUB with known fibroids  Patient had fibroids diagnosed in [facility] and was given an iron infusion and told that she needed her uterus removed  She is unable to tolerate PO iron  She states that her bleeding for the past 5 months has not been as heavy as before  She reports that she does not have insurance and owes money and therefore cannot get iron infusions at this time  Becky Pink states that for the past few days, she has increased itching and discharge  She has had yeast infections before but reports that this discharge has been more chunky like cottage cheese  She states that she cannot stand it  Patient Active Problem List   Diagnosis    Class 1 obesity due to excess calories with body mass index (BMI) of 34 0 to 34 9 in adult    Abnormal uterine bleeding (AUB)    Yeast infection     Past Medical History:   Diagnosis Date    Anemia        Objective:    Vitals: Blood pressure 113/77, pulse 76, weight 94 8 kg (209 lb), last menstrual period 08/05/2022  Body mass index is 40 82 kg/m²  Physical Exam    Endometrial biopsy    Date/Time: 8/25/2022 4:18 PM  Performed by: Neli Galarza MD  Authorized by: Neli Galarza MD   Universal Protocol:  Consent: Verbal consent obtained  Risks and benefits: risks, benefits and alternatives were discussed  Consent given by: patient  Patient understanding: patient states understanding of the procedure being performed  Test results: test results available and properly labeled  Radiology Images displayed and confirmed  If images not available, report reviewed: imaging studies available  Required items: required blood products, implants, devices, and special equipment available  Patient identity confirmed: verbally with patient      Indication:     Indications:  Other disorder of menstruation and other abnormal bleeding from female genital tract      Indications comment:  AUB resulting in chronic anemia  Procedure:     Procedure: endometrial biopsy with Pipelle      A bivalve speculum was placed in the vagina: yes      Cervix cleaned and prepped: yes (Cervix thoroughly cleaned with betadine prior to the procedure  Multiparous cervix noted  )      Uterus sounded: yes      Uterus sound depth (cm):  10    Specimen collected: specimen collected and sent to pathology      Patient tolerated procedure well with no complications: yes    Findings:     Uterus size:  9-10 weeks    Cervix: normal        Assessment/Plan:    Problem List Items Addressed This Visit        Unprioritized    Abnormal uterine bleeding (AUB) - Primary     We reviewed her US in detail as follows:     UTERUS:  The uterus is anteverted in position, measuring 10 0 x 4 5 x 6 4 cm  The uterus has a normal contour and echotexture  There is a 1 7 x 1 5 x 1 9 cm intramural posterior uterine body fibroid  The cervix appears within normal limits      ENDOMETRIUM:    The endometrial echo complex has an AP caliber of 10 0 mm  Tiny vague echogenic areas within the endometrium      OVARIES/ADNEXA:  Right ovary:  3 5 x 2 1 x 2 4 cm  9 2 mL  No suspicious right ovarian abnormality  Doppler flow within normal limits      Left ovary:  5 2 x 3 5 x 2 0 cm  18 5 mL  No suspicious left ovarian abnormality  Doppler flow within normal limits      No suspicious adnexal mass or loculated collections  There is no free fluid      IMPRESSION:     Single uterine fibroid  Tiny vague echogenic areas within the endometrium, which may be within normal limits  However, if the patient is experiencing irregular uterine bleeding, endometrial polyps are possible      Ovaries are within normal limits  Given that her fibroid is small and intramural this is unlikely to be the cause of her AUB  However, possible polypoid tissue was noted on ultrasound and would be consistent with her AUB       At this time, patient was counseled that given her level of anemia, she should have iron infusions  Patient states that she cannot afford iron infusions  She states that she cannot afford to go to the hospital for surgery as she does not have insurance  We reviewed that in office EMB can be completed but she is likely to need further management  As her level of anemia is significant, active management is preferred over expectant management  Since her last few periods have been less heavy, we decided to complete EMB today  We reviewed the possibility of benign findings vs  Hyperplasia vs  Malignancy  Patient instructed to RTO in 1 month, complete CBC prior to that time to reassess her anemia status, and will discuss further management at that time  Relevant Orders    Tissue Exam    Yeast infection     Wet mount/KOH significant for moderate hyphae  Diflucan prescribed            Relevant Medications    fluconazole (DIFLUCAN) 150 mg tablet        Mami Sykes MD  8/25/2022  4:21 PM

## 2022-08-25 NOTE — ASSESSMENT & PLAN NOTE
We reviewed her US in detail as follows:     UTERUS:  The uterus is anteverted in position, measuring 10 0 x 4 5 x 6 4 cm  The uterus has a normal contour and echotexture  There is a 1 7 x 1 5 x 1 9 cm intramural posterior uterine body fibroid  The cervix appears within normal limits      ENDOMETRIUM:    The endometrial echo complex has an AP caliber of 10 0 mm  Tiny vague echogenic areas within the endometrium      OVARIES/ADNEXA:  Right ovary:  3 5 x 2 1 x 2 4 cm  9 2 mL  No suspicious right ovarian abnormality  Doppler flow within normal limits      Left ovary:  5 2 x 3 5 x 2 0 cm  18 5 mL  No suspicious left ovarian abnormality  Doppler flow within normal limits      No suspicious adnexal mass or loculated collections  There is no free fluid      IMPRESSION:     Single uterine fibroid  Tiny vague echogenic areas within the endometrium, which may be within normal limits  However, if the patient is experiencing irregular uterine bleeding, endometrial polyps are possible      Ovaries are within normal limits  Given that her fibroid is small and intramural this is unlikely to be the cause of her AUB  However, possible polypoid tissue was noted on ultrasound and would be consistent with her AUB  At this time, patient was counseled that given her level of anemia, she should have iron infusions  Patient states that she cannot afford iron infusions  She states that she cannot afford to go to the hospital for surgery as she does not have insurance  We reviewed that in office EMB can be completed but she is likely to need further management  As her level of anemia is significant, active management is preferred over expectant management  Since her last few periods have been less heavy, we decided to complete EMB today  We reviewed the possibility of benign findings vs  Hyperplasia vs  Malignancy   Patient instructed to RTO in 1 month, complete CBC prior to that time to reassess her anemia status, and will discuss further management at that time

## 2022-08-31 NOTE — RESULT ENCOUNTER NOTE
Please call the patient and inform her that her HIV and hepatitis-C testing are negative  She is not diabetic  She does, however have high cholesterol, specifically her LDL, at 173  Please let her know that with her levels, this can be managed with lifestyle modifications such as decreasing foods which are high in trans and saturated fats, as well as moderate intensity exercise for 4-5 days per week  Thank you!

## 2022-09-08 DIAGNOSIS — N71.1 CHRONIC ENDOMETRITIS: Primary | ICD-10-CM

## 2022-09-08 RX ORDER — DOXYCYCLINE HYCLATE 100 MG/1
100 CAPSULE ORAL EVERY 12 HOURS SCHEDULED
Qty: 28 CAPSULE | Refills: 0 | Status: SHIPPED | OUTPATIENT
Start: 2022-09-08 | End: 2022-09-22

## 2022-09-28 ENCOUNTER — PROCEDURE VISIT (OUTPATIENT)
Dept: OBGYN CLINIC | Facility: CLINIC | Age: 42
End: 2022-09-28

## 2022-09-28 ENCOUNTER — LAB (OUTPATIENT)
Dept: LAB | Facility: CLINIC | Age: 42
End: 2022-09-28
Payer: COMMERCIAL

## 2022-09-28 VITALS
BODY MASS INDEX: 41.03 KG/M2 | WEIGHT: 209 LBS | HEIGHT: 60 IN | SYSTOLIC BLOOD PRESSURE: 110 MMHG | DIASTOLIC BLOOD PRESSURE: 74 MMHG | HEART RATE: 92 BPM

## 2022-09-28 DIAGNOSIS — N71.9 ENDOMETRITIS: Primary | ICD-10-CM

## 2022-09-28 DIAGNOSIS — R10.13 EPIGASTRIC PAIN: ICD-10-CM

## 2022-09-28 DIAGNOSIS — D64.9 ANEMIA, UNSPECIFIED TYPE: ICD-10-CM

## 2022-09-28 LAB — FERRITIN SERPL-MCNC: 4 NG/ML (ref 8–388)

## 2022-09-28 PROCEDURE — 36415 COLL VENOUS BLD VENIPUNCTURE: CPT

## 2022-09-28 PROCEDURE — 82728 ASSAY OF FERRITIN: CPT

## 2022-09-28 RX ORDER — DOXYCYCLINE HYCLATE 100 MG/1
100 CAPSULE ORAL EVERY 12 HOURS SCHEDULED
Qty: 14 CAPSULE | Refills: 0 | Status: SHIPPED | OUTPATIENT
Start: 2022-09-28 | End: 2022-10-05

## 2022-09-28 NOTE — PROGRESS NOTES
Subjective:      [employer] interpretor used for entire visit     Giuseppe Chapin is a 46 y o  presents to discuss EMB results that were done on 8/25/22 that was consistent with possible endometritis but negative for malignancy  Per provider documentation, a call was attempted to send an antibiotic however patient never received the message  She also had a TVUS 8/12/22 showed a small 1 7 x 1 5 x 1 9 cm intramural posterior uterine body fibroid and was otherwise unremarkable  Her periods are now more regular, occur monthly, and last for about 7 days with 4 days of heavier bleeding  She saturates about 4-5 super pads daily on her heaviest days  She was previously having irregular daily bleeding back in March, but that has resolved  She has previously been on OCPs years ago that regulated her periods, however she stopped taking them due to reported gastric discomfort  Objective:    Vitals: Blood pressure 110/74, pulse 92, height 5' (1 524 m), weight 94 8 kg (209 lb), last menstrual period 09/28/2022  Body mass index is 40 82 kg/m²  Physical Exam  Constitutional:       General: She is not in acute distress  Appearance: She is not ill-appearing or toxic-appearing  HENT:      Head: Normocephalic and atraumatic  Pulmonary:      Effort: Pulmonary effort is normal  No respiratory distress  Skin:     General: Skin is warm and dry  Neurological:      General: No focal deficit present  Mental Status: She is alert and oriented to person, place, and time  Psychiatric:         Mood and Affect: Mood normal          Thought Content: Thought content normal          Judgment: Judgment normal          Assessment/Plan: This is a 47 yo who presents for follow up of irregular menses  Her periods have recently been regular and much lighter  Explained that we can monitor for a few months and if bleeding becomes heavy again, can discuss medical management at that time   We discussed that her last hemoglobin was 7 5 on 7/21/22 and that this was likely iron deficiency anemia, however her ferritin level is still in process  We discussed treatment with Venofer infusions now that she has insurance and she does not tolerate PO iron well  Dr Neal Calhoun had also previously recommended treating possible endometritis and patient is in agreement       - Rx sent for Doxycycline   - Will order Venofer infusions once ferritin returns  - Patient will have endoscopy and further work up for anemia with PCP now that she has insurance   - RTO in 3 months for follow up            Merry Anton  9/28/2022  2:15 PM

## 2022-09-29 ENCOUNTER — APPOINTMENT (OUTPATIENT)
Dept: LAB | Facility: HOSPITAL | Age: 42
End: 2022-09-29
Payer: COMMERCIAL

## 2022-09-29 ENCOUNTER — OFFICE VISIT (OUTPATIENT)
Dept: FAMILY MEDICINE CLINIC | Facility: CLINIC | Age: 42
End: 2022-09-29

## 2022-09-29 VITALS
HEIGHT: 60 IN | OXYGEN SATURATION: 97 % | SYSTOLIC BLOOD PRESSURE: 114 MMHG | HEART RATE: 78 BPM | DIASTOLIC BLOOD PRESSURE: 72 MMHG | RESPIRATION RATE: 18 BRPM | WEIGHT: 208.4 LBS | TEMPERATURE: 97.2 F | BODY MASS INDEX: 40.91 KG/M2

## 2022-09-29 DIAGNOSIS — Z23 ENCOUNTER FOR IMMUNIZATION: ICD-10-CM

## 2022-09-29 DIAGNOSIS — N93.9 ABNORMAL UTERINE BLEEDING (AUB): Primary | ICD-10-CM

## 2022-09-29 DIAGNOSIS — D50.9 IRON DEFICIENCY ANEMIA, UNSPECIFIED IRON DEFICIENCY ANEMIA TYPE: Primary | ICD-10-CM

## 2022-09-29 DIAGNOSIS — D50.9 IRON DEFICIENCY ANEMIA, UNSPECIFIED IRON DEFICIENCY ANEMIA TYPE: ICD-10-CM

## 2022-09-29 PROBLEM — B37.9 YEAST INFECTION: Status: RESOLVED | Noted: 2022-08-25 | Resolved: 2022-09-29

## 2022-09-29 LAB
BASOPHILS # BLD AUTO: 0.05 THOUSANDS/ΜL (ref 0–0.1)
BASOPHILS NFR BLD AUTO: 1 % (ref 0–1)
EOSINOPHIL # BLD AUTO: 0.11 THOUSAND/ΜL (ref 0–0.61)
EOSINOPHIL NFR BLD AUTO: 2 % (ref 0–6)
ERYTHROCYTE [DISTWIDTH] IN BLOOD BY AUTOMATED COUNT: 21 % (ref 11.6–15.1)
FERRITIN SERPL-MCNC: 6 NG/ML (ref 8–388)
HCT VFR BLD AUTO: 27.2 % (ref 34.8–46.1)
HGB BLD-MCNC: 7.3 G/DL (ref 11.5–15.4)
IMM GRANULOCYTES # BLD AUTO: 0.02 THOUSAND/UL (ref 0–0.2)
IMM GRANULOCYTES NFR BLD AUTO: 0 % (ref 0–2)
IRON SATN MFR SERPL: 4 % (ref 15–50)
IRON SERPL-MCNC: 18 UG/DL (ref 50–170)
LYMPHOCYTES # BLD AUTO: 2.93 THOUSANDS/ΜL (ref 0.6–4.47)
LYMPHOCYTES NFR BLD AUTO: 43 % (ref 14–44)
MCH RBC QN AUTO: 17.6 PG (ref 26.8–34.3)
MCHC RBC AUTO-ENTMCNC: 26.8 G/DL (ref 31.4–37.4)
MCV RBC AUTO: 66 FL (ref 82–98)
MONOCYTES # BLD AUTO: 0.57 THOUSAND/ΜL (ref 0.17–1.22)
MONOCYTES NFR BLD AUTO: 8 % (ref 4–12)
NEUTROPHILS # BLD AUTO: 3.09 THOUSANDS/ΜL (ref 1.85–7.62)
NEUTS SEG NFR BLD AUTO: 46 % (ref 43–75)
NRBC BLD AUTO-RTO: 0 /100 WBCS
PLATELET # BLD AUTO: 383 THOUSANDS/UL (ref 149–390)
RBC # BLD AUTO: 4.15 MILLION/UL (ref 3.81–5.12)
TIBC SERPL-MCNC: 416 UG/DL (ref 250–450)
WBC # BLD AUTO: 6.77 THOUSAND/UL (ref 4.31–10.16)

## 2022-09-29 PROCEDURE — 85025 COMPLETE CBC W/AUTO DIFF WBC: CPT

## 2022-09-29 PROCEDURE — 90471 IMMUNIZATION ADMIN: CPT | Performed by: FAMILY MEDICINE

## 2022-09-29 PROCEDURE — 90472 IMMUNIZATION ADMIN EACH ADD: CPT | Performed by: FAMILY MEDICINE

## 2022-09-29 PROCEDURE — 83550 IRON BINDING TEST: CPT

## 2022-09-29 PROCEDURE — 82728 ASSAY OF FERRITIN: CPT

## 2022-09-29 PROCEDURE — 90686 IIV4 VACC NO PRSV 0.5 ML IM: CPT | Performed by: FAMILY MEDICINE

## 2022-09-29 PROCEDURE — 36415 COLL VENOUS BLD VENIPUNCTURE: CPT

## 2022-09-29 PROCEDURE — 99213 OFFICE O/P EST LOW 20 MIN: CPT | Performed by: FAMILY MEDICINE

## 2022-09-29 PROCEDURE — 90715 TDAP VACCINE 7 YRS/> IM: CPT | Performed by: FAMILY MEDICINE

## 2022-09-29 PROCEDURE — 83540 ASSAY OF IRON: CPT

## 2022-09-29 RX ORDER — SODIUM CHLORIDE 9 MG/ML
20 INJECTION, SOLUTION INTRAVENOUS ONCE
Status: CANCELLED | OUTPATIENT
Start: 2022-10-06

## 2022-09-29 NOTE — ASSESSMENT & PLAN NOTE
CBC from 7/2022 showed hemoglobin of 7 5 and MCV of 67  Ferritin levels extremely low at 4  TVUS 8/2022: single uterine fibroid, tiny vague echogenic areas within the endometrium, which may be within normal limits  However, if patient is experiencing irregular uterine bleeding, endometrial polyps are possible  Ovaries are within normal limits  Endometrial Biopsy (8/2022): fragments of secretory endometrium with reactive lymphoid aggregates and scattered plasma cells (MUM-1)  Negative for endometrial intraepithelial neoplasia (EIN), malignancy or lymphoproliferative disorder  Menstrual Cycles: now more regular, occur monthly, and last for about 7 days with 4 days of heavier bleeding  She saturates about 4-5 super pads daily on her heaviest days  She was previously having irregular daily bleeding back in March, but that has resolved  She has previously been on OCPs years ago that regulated her periods, however she stopped taking them due to reported gastric discomfort  Patient denies shortness of breath  No tachycardia and mildly pale on physical exam  Does complain of mild fatigue but is otherwise asymptomatic      - Repeat CBC ordered  - Protonix 40 mg daily  - Venofer infusions ordered starting next week, weekly for 6 weeks  Patient given number for  infusion center and advised multiple times to call today for an appointment   - Ordered repeat CBC and iron panel to be completed 6-8 weeks after last Venofer infusion    - Referral to GI placed for EGD/colonoscopy to investigate possible cause of anemia

## 2022-09-29 NOTE — PROGRESS NOTES
Name: Filomena Blake MurilloReyes      : 1980      MRN: [id number]  Encounter Provider: Cornelius Bueno MD  Encounter Date: 2022   Encounter department: [facility]     1  Iron deficiency anemia, unspecified iron deficiency anemia type  Assessment & Plan:  CBC from 2022 showed hemoglobin of 7 5 and MCV of 67  Ferritin levels extremely low at 4  TVUS 2022: single uterine fibroid, tiny vague echogenic areas within the endometrium, which may be within normal limits  However, if patient is experiencing irregular uterine bleeding, endometrial polyps are possible  Ovaries are within normal limits  Endometrial Biopsy (2022): fragments of secretory endometrium with reactive lymphoid aggregates and scattered plasma cells (MUM-1)  Negative for endometrial intraepithelial neoplasia (EIN), malignancy or lymphoproliferative disorder  Menstrual Cycles: now more regular, occur monthly, and last for about 7 days with 4 days of heavier bleeding  She saturates about 4-5 super pads daily on her heaviest days  She was previously having irregular daily bleeding back in March, but that has resolved  She has previously been on OCPs years ago that regulated her periods, however she stopped taking them due to reported gastric discomfort  Patient denies shortness of breath  No tachycardia and mildly pale on physical exam  Does complain of mild fatigue but is otherwise asymptomatic      - Repeat CBC ordered  - Protonix 40 mg daily  - Venofer infusions ordered starting next week, weekly for 6 weeks  Patient given number for  infusion center and advised multiple times to call today for an appointment   - Ordered repeat CBC and iron panel to be completed 6-8 weeks after last Venofer infusion    - Referral to GI placed for EGD/colonoscopy to investigate possible cause of anemia  Orders:  -     Ambulatory Referral to Gastroenterology;  Future  -     CBC and differential; Future  -     CBC and differential; Future; Expected date: 12/29/2022  -     Iron Panel (Includes Ferritin, Iron Sat%, Iron, and TIBC); Future; Expected date: 12/29/2022    2  Encounter for immunization  -     TDAP VACCINE GREATER THAN OR EQUAL TO 6YO IM  -     influenza vaccine, quadrivalent, 0 5 mL, preservative-free, for adult and pediatric patients 6 mos+ (AFLURIA, FLUARIX, FLULAVAL, FLUZONE)         Health Maintenance:    - Patient received Tdap and flu vaccines today  Subjective      This is a very pleasant 46 y o  female with PMH of iron deficiency anemia who presents to the clinic for management of their chronic medical conditions  Patient's medical conditions are stable unless noted otherwise above  Patient has not had any recent hospitalizations or medical emergencies since last visit  Patient has no further complaints other than what is mentioned in the review of systems   Jyothi Cota [id number] used for this visit  Review of Systems   Constitutional: Positive for fatigue  Negative for activity change, appetite change, chills and fever  HENT: Negative for sore throat  Respiratory: Negative for cough and shortness of breath  Cardiovascular: Negative for chest pain, palpitations and leg swelling  Gastrointestinal: Negative for abdominal pain, constipation, diarrhea, nausea and vomiting  Musculoskeletal: Negative for back pain and gait problem  Neurological: Negative for dizziness, seizures, weakness and headaches         Current Outpatient Medications on File Prior to Visit   Medication Sig    doxycycline hyclate (VIBRAMYCIN) 100 mg capsule Take 1 capsule (100 mg total) by mouth every 12 (twelve) hours for 7 days    pantoprazole (PROTONIX) 40 mg tablet Take 1 tablet (40 mg total) by mouth daily       Objective     /72 (BP Location: Left arm, Patient Position: Sitting, Cuff Size: Adult)   Pulse 78   Temp (!) 97 2 °F (36 2 °C) (Temporal)   Resp 18 Ht 5' (1 524 m)   Wt 94 5 kg (208 lb 6 4 oz)   LMP 09/28/2022   SpO2 97%   BMI 40 70 kg/m²     Physical Exam  Vitals reviewed  Constitutional:       General: She is not in acute distress  Appearance: She is well-developed  She is not ill-appearing or diaphoretic  HENT:      Head: Normocephalic and atraumatic  Nose: No congestion or rhinorrhea  Mouth/Throat:      Mouth: Mucous membranes are moist       Pharynx: Oropharynx is clear  No oropharyngeal exudate or posterior oropharyngeal erythema  Eyes:      General: No scleral icterus  Right eye: No discharge  Left eye: No discharge  Conjunctiva/sclera: Conjunctivae normal       Comments: Conjunctiva mildly pale    Cardiovascular:      Rate and Rhythm: Normal rate and regular rhythm  Heart sounds: Normal heart sounds  No murmur heard  No friction rub  No gallop  Pulmonary:      Effort: Pulmonary effort is normal  No respiratory distress  Breath sounds: Normal breath sounds  No wheezing or rales  Abdominal:      General: Bowel sounds are normal  There is no distension  Palpations: Abdomen is soft  There is no mass  Tenderness: There is no abdominal tenderness  There is no guarding  Musculoskeletal:         General: No tenderness or deformity  Normal range of motion  Cervical back: Normal range of motion  Right lower leg: No edema  Left lower leg: No edema  Skin:     General: Skin is warm and dry  Neurological:      General: No focal deficit present  Mental Status: She is alert and oriented to person, place, and time            Mike Bauer MD   9/29/2022

## 2022-10-06 ENCOUNTER — HOSPITAL ENCOUNTER (OUTPATIENT)
Dept: INFUSION CENTER | Facility: HOSPITAL | Age: 42
End: 2022-10-06
Payer: COMMERCIAL

## 2022-10-06 VITALS
OXYGEN SATURATION: 100 % | TEMPERATURE: 97.9 F | HEART RATE: 83 BPM | DIASTOLIC BLOOD PRESSURE: 74 MMHG | RESPIRATION RATE: 18 BRPM | SYSTOLIC BLOOD PRESSURE: 116 MMHG

## 2022-10-06 DIAGNOSIS — N93.9 ABNORMAL UTERINE BLEEDING (AUB): Primary | ICD-10-CM

## 2022-10-06 PROCEDURE — 96365 THER/PROPH/DIAG IV INF INIT: CPT

## 2022-10-06 RX ORDER — SODIUM CHLORIDE 9 MG/ML
20 INJECTION, SOLUTION INTRAVENOUS ONCE
Status: COMPLETED | OUTPATIENT
Start: 2022-10-06 | End: 2022-10-06

## 2022-10-06 RX ORDER — SODIUM CHLORIDE 9 MG/ML
20 INJECTION, SOLUTION INTRAVENOUS ONCE
Status: CANCELLED | OUTPATIENT
Start: 2022-10-13

## 2022-10-06 RX ADMIN — SODIUM CHLORIDE 20 ML/HR: 0.9 INJECTION, SOLUTION INTRAVENOUS at 13:41

## 2022-10-06 RX ADMIN — IRON SUCROSE 200 MG: 20 INJECTION, SOLUTION INTRAVENOUS at 13:42

## 2022-10-13 ENCOUNTER — HOSPITAL ENCOUNTER (OUTPATIENT)
Dept: INFUSION CENTER | Facility: HOSPITAL | Age: 42
End: 2022-10-13
Payer: COMMERCIAL

## 2022-10-13 VITALS
DIASTOLIC BLOOD PRESSURE: 66 MMHG | OXYGEN SATURATION: 97 % | SYSTOLIC BLOOD PRESSURE: 99 MMHG | RESPIRATION RATE: 18 BRPM | HEART RATE: 84 BPM | TEMPERATURE: 97.5 F

## 2022-10-13 DIAGNOSIS — N93.9 ABNORMAL UTERINE BLEEDING (AUB): Primary | ICD-10-CM

## 2022-10-13 PROCEDURE — 96365 THER/PROPH/DIAG IV INF INIT: CPT

## 2022-10-13 RX ORDER — SODIUM CHLORIDE 9 MG/ML
20 INJECTION, SOLUTION INTRAVENOUS ONCE
Status: COMPLETED | OUTPATIENT
Start: 2022-10-13 | End: 2022-10-13

## 2022-10-13 RX ORDER — SODIUM CHLORIDE 9 MG/ML
20 INJECTION, SOLUTION INTRAVENOUS ONCE
Status: CANCELLED | OUTPATIENT
Start: 2022-10-20

## 2022-10-13 RX ADMIN — SODIUM CHLORIDE 20 ML/HR: 9 INJECTION, SOLUTION INTRAVENOUS at 14:36

## 2022-10-13 RX ADMIN — IRON SUCROSE 200 MG: 20 INJECTION, SOLUTION INTRAVENOUS at 14:36

## 2022-10-20 ENCOUNTER — HOSPITAL ENCOUNTER (OUTPATIENT)
Dept: INFUSION CENTER | Facility: HOSPITAL | Age: 42
End: 2022-10-20
Payer: COMMERCIAL

## 2022-10-20 VITALS
HEART RATE: 72 BPM | TEMPERATURE: 97.5 F | DIASTOLIC BLOOD PRESSURE: 64 MMHG | RESPIRATION RATE: 18 BRPM | OXYGEN SATURATION: 97 % | SYSTOLIC BLOOD PRESSURE: 104 MMHG

## 2022-10-20 DIAGNOSIS — N93.9 ABNORMAL UTERINE BLEEDING (AUB): Primary | ICD-10-CM

## 2022-10-20 PROCEDURE — 96365 THER/PROPH/DIAG IV INF INIT: CPT

## 2022-10-20 RX ORDER — SODIUM CHLORIDE 9 MG/ML
20 INJECTION, SOLUTION INTRAVENOUS ONCE
Status: COMPLETED | OUTPATIENT
Start: 2022-10-20 | End: 2022-10-20

## 2022-10-20 RX ORDER — SODIUM CHLORIDE 9 MG/ML
20 INJECTION, SOLUTION INTRAVENOUS ONCE
Status: CANCELLED | OUTPATIENT
Start: 2022-10-27

## 2022-10-20 RX ADMIN — SODIUM CHLORIDE 20 ML/HR: 9 INJECTION, SOLUTION INTRAVENOUS at 08:33

## 2022-10-20 RX ADMIN — IRON SUCROSE 200 MG: 20 INJECTION, SOLUTION INTRAVENOUS at 08:33

## 2022-10-28 ENCOUNTER — CONSULT (OUTPATIENT)
Dept: GASTROENTEROLOGY | Facility: MEDICAL CENTER | Age: 42
End: 2022-10-28

## 2022-10-28 VITALS
BODY MASS INDEX: 39.96 KG/M2 | WEIGHT: 204.6 LBS | SYSTOLIC BLOOD PRESSURE: 110 MMHG | HEART RATE: 76 BPM | DIASTOLIC BLOOD PRESSURE: 71 MMHG | TEMPERATURE: 97.8 F

## 2022-10-28 DIAGNOSIS — R10.13 EPIGASTRIC PAIN: ICD-10-CM

## 2022-10-28 DIAGNOSIS — K59.09 OTHER CONSTIPATION: ICD-10-CM

## 2022-10-28 DIAGNOSIS — D50.9 IRON DEFICIENCY ANEMIA, UNSPECIFIED IRON DEFICIENCY ANEMIA TYPE: Primary | ICD-10-CM

## 2022-10-28 NOTE — PATIENT INSTRUCTIONS
Enfermedad por reflujo gastroesofágico (ERGE)   LO QUE NECESITA SABER:   La enfermedad por reflujo gastroesofágico ([place]) es el reflujo que ocurre más de 2 veces a la semana rene varias semanas  Reflujo significa que el ácido y los alimentos en el estómago regresan al esófago  La ERGE puede causar otros problemas de mary con el tiempo si no es tratada  Arlen Lema EL NAE HOSPITALARIA:   Llame al [facility] de emergencias local (911 en [place]) si:  Usted siente dolor viral en el pecho y dificultad repentina para respirar  Regrese a la fercho de emergencias si:  Tiene dificultad para respirar después de vomitar  Tiene dificultad para tragar o dolor al tragar  Mary evacuaciones intestinales son de color lucio, con Emelina, o de apariencia alquitranada  Boo vómito parece moises café molido o contiene osmel  Llame a boo médico o gastroenterólogo si:  Lorraine Esparza y no puede eructar o vomitar  Vomita grandes cantidades, o vomita con frecuencia  Usted pierde peso sin proponérselo  Mary síntomas empeoran o no mejoran con el tratamiento  Usted tiene preguntas o inquietudes acerca de boo condición o cuidado  Medicamentos:  Los medicamentos para disminuir el ácido [place] medicamentos también podrían usarse para ayudar a que boo esfínter esofágico inferior y boo estómago se contraigan (estrechen) más  [place] mary medicamentos moises se le haya indicado  Consulte con boo médico si usted coral que boo medicamento no le está ayudando o si presenta efectos secundarios  Infórmele si es alérgico a algún medicamento  Mantenga brown lista actualizada de los [place], las vitaminas y los productos herbales que dheeraj  Incluya los siguientes datos de los medicamentos: cantidad, frecuencia y motivo de administración  Traiga con usted la lista o los envases de las píldoras a mary citas de seguimiento   Lleve la lista de los medicamentos con usted en jovon de [place] emergencia  Control de la ERGE:      No consuma alimentos o bebidas que puedan aumentar la [place]  Estos incluyen chocolate, menta, comidas fritas o grasosas, bebidas que contienen cafeína o bebidas gaseosas  Otros alimentos incluyen comidas picantes, cebollas, tomates y alimentos a base de tomate  No consuma alimentos y bebidas que puedan irritar boo esófago, moises las frutas cítricas, los jugos y las bebidas alcohólicas  No ingiera comidas abundantes  Cuando usted come [facility] [employer] a la vez, boo estómago necesita más ácido para digerirla  Consuma 6 comidas pequeñas al día en vez de 3 comidas grandes y coma lentamente  No consuma alimentos entre 2 y 3 horas antes de WED[place]UP  Eleve la cabecera de boo cama  Coloque bloques de 6 pulgadas debajo de la cabecera de la estructura de boo cama  También podría usar más brown almohada para apoyar boo carroll y hombros mientras duerme  Mantenga un peso saludable  Si usted tiene sobrepeso, la pérdida de peso podría ayudar a aliviar los síntomas de la [place]  No fume  Fumar debilita el esfínter esofágico inferior y [place] el riesgo de [place]pc[place]nh[place]Hg[place]  Pida información a boo médico si usted actualmente fuma y necesita ayuda para dejar de fumar  Los cigarrillos electrónicos o el tabaco sin humo igualmente contienen nicotina  Consulte con boo médico antes de [employer]  No aplique presión sobre el abdomen  La presión empuja el ácido hacia el esófago  No use ropa que Janneth alrededor de [place]  No se agache  Doble las rodillas para recoger algo  Acuda a hailey consultas de control con boo médico o gastroenterólogo según le indicaron: Anote hailey preguntas para que se acuerde de hacerlas rene hailey visitas  © Copyright [facility] 2022 Information is for End User's use only and may not be sold, redistributed or otherwise used for commercial purposes   All illustrations and images included in CareNotes® are the copyrighted property of [employer]  or [employer] [address]  Esta información es sólo para uso en educación  Boo intención no es darle un consejo médico sobre enfermedades o tratamientos  Colsulte con boo Melissa Halim farmacéutico antes de seguir cualquier régimen médico para saber si es seguro y efectivo para usted        Scheduled date of Colon/EGD (as of today) 1/19/23  Physician performing: Dr Colletta Falco  Location of procedure:  [facility]  Bowel prep reviewed with patient: Golytely  Instructions reviewed with patient by: MA  Clearances: N/A

## 2022-10-28 NOTE — PROGRESS NOTES
Assessment/Plan:     Diagnoses and all orders for this visit:    Iron deficiency anemia, unspecified iron deficiency anemia type  Patient has a history of iron deficiency anemia, possibly secondary to abnormal uterine bleeding  She is since undergone iron infusions, no recent blood work  Would recommend checking at this time  It was also recommended that she undergo endoscopy and colonoscopy to evaluate for GI blood loss, unable to do so prior due to no insurance, will schedule at this time  -     Ambulatory Referral to Gastroenterology  -     CBC and Platelet; Future  -     Iron Panel (Includes Ferritin, Iron Sat%, Iron, and TIBC); Future  -     polyethylene glycol (GOLYTELY) 4000 mL solution; Take 4,000 mL by mouth once for 1 dose    Epigastric pain  Patient complains of epigastric pain  Was previously found to have H pylori  She was treated with antibiotics however stool testing was not done  We can biopsy during her endoscopy  She is on pantoprazole and has noticed some improvement of her symptoms, also states eating seems to help  We also discussed the reflux diet and I will provide her handout  Other constipation  She does admit to constipation  She was using an over-the-counter laxative on an as-needed basis  Would recommend starting with MiraLax daily and titrating up or down accordingly  Will see her back after procedures to discuss all results  Subjective:      Patient ID: Faviola Price is a 46 y o  female  HPI     This is a follow-up for epigastric pain, H pylori and iron deficiency anemia  Patient is Tunisian-speaking only and all history is via an   Patient was last seen in August for the above-mentioned symptoms  She was initially seen in the emergency room in July complaining of abdominal pain she was found to have H pylori  She was treated with antibiotics but stool testing was not done to confirm eradication    It was ordered at her last visit but again was not done  She was prescribed pantoprazole which she feels works well for her  She was also found to be anemic, hemoglobin of 7  She is since undergone iron infusions but has not had any repeat blood work  She does complain of constipation  She states she did try an over-the-counter laxative, is unsure of its name  She was recommended to undergo endoscopy and colonoscopy however these were not performed given she did not have insurance at that time  Patient Active Problem List   Diagnosis   • Class 1 obesity due to excess calories with body mass index (BMI) of 34 0 to 34 9 in adult   • Abnormal uterine bleeding (AUB)   • Iron deficiency anemia   • Epigastric pain   • Other constipation     Allergies   Allergen Reactions   • Iron [Ferrous Sulfate] Abdominal Pain     Current Outpatient Medications on File Prior to Visit   Medication Sig   • pantoprazole (PROTONIX) 40 mg tablet Take 1 tablet (40 mg total) by mouth daily     No current facility-administered medications on file prior to visit       Family History   Problem Relation Age of Onset   • Diabetes Mother    • Diabetes Father    • Colon cancer Neg Hx    • Colon polyps Neg Hx    • Stomach cancer Neg Hx      Past Medical History:   Diagnosis Date   • Anemia    • Yeast infection 8/25/2022     Social History     Socioeconomic History   • Marital status: Single     Spouse name: None   • Number of children: None   • Years of education: None   • Highest education level: None   Occupational History   • None   Tobacco Use   • Smoking status: Never Smoker   • Smokeless tobacco: Never Used   Vaping Use   • Vaping Use: Never used   Substance and Sexual Activity   • Alcohol use: Not Currently   • Drug use: Never   • Sexual activity: Not Currently   Other Topics Concern   • None   Social History Narrative   • None     Social Determinants of Health     Financial Resource Strain: Low Risk    • Difficulty of Paying Living Expenses: Not hard at all   Food Insecurity: No Food Insecurity   • Worried About Running Out of Food in the Last Year: Never true   • Ran Out of Food in the Last Year: Never true   Transportation Needs: No Transportation Needs   • Lack of Transportation (Medical): No   • Lack of Transportation (Non-Medical): No   Physical Activity: Not on file   Stress: Not on file   Social Connections: Not on file   Intimate Partner Violence: Not on file   Housing Stability: Low Risk    • Unable to Pay for Housing in the Last Year: No   • Number of Places Lived in the Last Year: 1   • Unstable Housing in the Last Year: No     Past Surgical History:   Procedure Laterality Date   • TUBAL LIGATION           Review of Systems   All other systems reviewed and are negative  Objective:      /71   Pulse 76   Temp 97 8 °F (36 6 °C) (Tympanic)   Wt 92 8 kg (204 lb 9 6 oz)   LMP 09/28/2022   BMI 39 96 kg/m²          Physical Exam  Constitutional:       Appearance: She is well-developed  She is obese  HENT:      Head: Normocephalic and atraumatic  Eyes:      Conjunctiva/sclera: Conjunctivae normal    Cardiovascular:      Rate and Rhythm: Normal rate and regular rhythm  Pulmonary:      Effort: Pulmonary effort is normal       Breath sounds: Normal breath sounds  Abdominal:      General: Bowel sounds are normal  There is no distension  Palpations: Abdomen is soft  Tenderness: There is no abdominal tenderness  Musculoskeletal:         General: Normal range of motion  Cervical back: Normal range of motion  Skin:     General: Skin is warm and dry  Neurological:      Mental Status: She is alert and oriented to person, place, and time     Psychiatric:         Mood and Affect: Mood normal          Behavior: Behavior normal

## 2022-11-03 ENCOUNTER — HOSPITAL ENCOUNTER (OUTPATIENT)
Dept: INFUSION CENTER | Facility: HOSPITAL | Age: 42
End: 2022-11-03

## 2022-11-03 ENCOUNTER — RA CDI HCC (OUTPATIENT)
Dept: OTHER | Facility: HOSPITAL | Age: 42
End: 2022-11-03

## 2022-11-03 VITALS
OXYGEN SATURATION: 98 % | HEART RATE: 72 BPM | DIASTOLIC BLOOD PRESSURE: 59 MMHG | RESPIRATION RATE: 18 BRPM | TEMPERATURE: 97.6 F | SYSTOLIC BLOOD PRESSURE: 96 MMHG

## 2022-11-03 DIAGNOSIS — N93.9 ABNORMAL UTERINE BLEEDING (AUB): Primary | ICD-10-CM

## 2022-11-03 RX ORDER — SODIUM CHLORIDE 9 MG/ML
20 INJECTION, SOLUTION INTRAVENOUS ONCE
Status: CANCELLED | OUTPATIENT
Start: 2022-11-10

## 2022-11-03 RX ORDER — SODIUM CHLORIDE 9 MG/ML
20 INJECTION, SOLUTION INTRAVENOUS ONCE
Status: COMPLETED | OUTPATIENT
Start: 2022-11-03 | End: 2022-11-03

## 2022-11-03 RX ADMIN — SODIUM CHLORIDE 200 MG: 9 INJECTION, SOLUTION INTRAVENOUS at 13:53

## 2022-11-03 RX ADMIN — SODIUM CHLORIDE 20 ML/HR: 0.9 INJECTION, SOLUTION INTRAVENOUS at 13:49

## 2022-11-03 NOTE — PROGRESS NOTES
Patient arrived on unit for Venofer  Denies any present issues/concerns  Tolerated Venofer infusion without incident  Aware of next appointment   AVS given to patient

## 2022-11-03 NOTE — PROGRESS NOTES
[facility]  coding opportunities       Chart reviewed, no opportunity found: CHART REVIEWED, NO OPPORTUNITY FOUND        Patients Insurance        Commercial Insurance: [employer]

## 2022-11-08 NOTE — ASSESSMENT & PLAN NOTE
Patient states she is having regular bowel movements- she is using a medication she got at the pharmacy to help her- she is unsure of the name

## 2022-11-08 NOTE — ASSESSMENT & PLAN NOTE
CBC from our last visit on 9/29/2022 showed hemoglobin of 7 3 and MCV of 66  Iron panel from same visit showed: iron saturation of 4, TIBC 416, jerry 18, ferritin levels extremely low at 6  TVUS 8/2022: single uterine fibroid, tiny vague echogenic areas within the endometrium, which may be within normal limits  However, if patient is experiencing irregular uterine bleeding, endometrial polyps are possible  Ovaries are within normal limits  Endometrial Biopsy (8/2022): fragments of secretory endometrium with reactive lymphoid aggregates and scattered plasma cells (MUM-1)  Negative for endometrial intraepithelial neoplasia (EIN), malignancy or lymphoproliferative disorder  Menstrual Cycles: now much more regular, occur monthly, and last for about 7 days with 4 days of heavier bleeding  She saturates about 4-5 super pads daily on her heaviest days  She was previously having irregular daily bleeding back in March, but that has resolved  She has previously been on OCPs years ago that regulated her periods, however she stopped taking them due to reported gastric discomfort  Patient denies shortness of breath  No tachycardia  States her fatigue and headaches have improved significantly since starting iron infusions  Venofer infusions were ordered at our last visit and patient has since had 5 infusions total and is tolerating well  GI referral was placed at last visit for EGD/colonoscsopy to further assess for cause of anemia- saw them on 10/28/22 and they will plan for EGD/colonoscopy- per chart review, scheduled for 1/2023     - Continue Venofer infusions and Protonix 40 mg daily   - Ordered repeat CBC and iron panel to be completed 6-8 weeks after last Venofer infusion    - Continue following with GI; appreciate recommendations

## 2022-11-08 NOTE — PROGRESS NOTES
Name: Wava Schiff MurilloReyes      : 1980      MRN: [id number]  Encounter Provider: Ignacia Chacko MD  Encounter Date: 11/10/2022   Encounter department: [facility]       1  Other iron deficiency anemia  Assessment & Plan:  CBC from our last visit on 2022 showed hemoglobin of 7 3 and MCV of 66  Iron panel from same visit showed: iron saturation of 4, TIBC 416, jerry 18, ferritin levels extremely low at 6  TVUS 2022: single uterine fibroid, tiny vague echogenic areas within the endometrium, which may be within normal limits  However, if patient is experiencing irregular uterine bleeding, endometrial polyps are possible  Ovaries are within normal limits  Endometrial Biopsy (2022): fragments of secretory endometrium with reactive lymphoid aggregates and scattered plasma cells (MUM-1)  Negative for endometrial intraepithelial neoplasia (EIN), malignancy or lymphoproliferative disorder  Menstrual Cycles: now much more regular, occur monthly, and last for about 7 days with 4 days of heavier bleeding  She saturates about 4-5 super pads daily on her heaviest days  She was previously having irregular daily bleeding back in March, but that has resolved  She has previously been on OCPs years ago that regulated her periods, however she stopped taking them due to reported gastric discomfort  Patient denies shortness of breath  No tachycardia  States her fatigue and headaches have improved significantly since starting iron infusions  Venofer infusions were ordered at our last visit and patient has since had 5 infusions total and is tolerating well     GI referral was placed at last visit for EGD/colonoscsopy to further assess for cause of anemia- saw them on 10/28/22 and they will plan for EGD/colonoscopy- per chart review, scheduled for 2023     - Continue Venofer infusions and Protonix 40 mg daily   - Ordered repeat CBC and iron panel to be completed 6-8 weeks after last Venofer infusion    - Continue following with GI; appreciate recommendations  2  Other constipation  Assessment & Plan:  Patient states she is having regular bowel movements- she is using a medication she got at the pharmacy to help her- she is unsure of the name  BMI Counseling: There is no height or weight on file to calculate BMI  The BMI is above normal  Nutrition recommendations include decreasing portion sizes, encouraging healthy choices of fruits and vegetables, limiting drinks that contain sugar, moderation in carbohydrate intake, reducing intake of saturated and trans fat and reducing intake of cholesterol  Exercise recommendations include moderate physical activity 150 minutes/week and exercising 3-5 times per week  No pharmacotherapy was ordered  Patient referred to PCP  Rationale for BMI follow-up plan is due to patient being overweight or obese  Health Maintenance:    - Patient has not yet gotten COVID vaccine and advised to do so  - Advised to schedule pap smear with me    Subjective      This is a very pleasant 46 y o  female with PMH of iron deficiency anemia who presents to the clinic for management of their chronic medical conditions  Patient's medical conditions are stable unless noted otherwise above  Patient has not had any recent hospitalizations or medical emergencies since last visit  Patient has no further complaints other than what is mentioned in the review of systems   Bridgette Giron [id number] used for this visit  Review of Systems   Constitutional: Negative for activity change, appetite change, chills, fatigue and fever  HENT: Negative for sore throat  Respiratory: Negative for cough and shortness of breath  Cardiovascular: Negative for chest pain, palpitations and leg swelling  Gastrointestinal: Negative for abdominal pain, constipation, diarrhea, nausea and vomiting     Musculoskeletal: Negative for back pain and gait problem  Neurological: Negative for dizziness, seizures, weakness and headaches  Current Outpatient Medications on File Prior to Visit   Medication Sig   • pantoprazole (PROTONIX) 40 mg tablet TAKE 1 TABLET BY MOUTH EVERY DAY   • polyethylene glycol (GOLYTELY) 4000 mL solution Take 4,000 mL by mouth once for 1 dose       Objective     BP 94/60 (BP Location: Left arm, Patient Position: Sitting, Cuff Size: Standard)   Pulse 67   Temp 97 5 °F (36 4 °C) (Temporal)   Resp 18   Wt 93 kg (205 lb)   LMP 10/24/2022   SpO2 97%   BMI 40 04 kg/m²     Physical Exam  Vitals reviewed  Constitutional:       General: She is not in acute distress  Appearance: She is well-developed  She is not ill-appearing or diaphoretic  HENT:      Head: Normocephalic and atraumatic  Nose: No congestion or rhinorrhea  Mouth/Throat:      Mouth: Mucous membranes are moist       Pharynx: Oropharynx is clear  No oropharyngeal exudate or posterior oropharyngeal erythema  Eyes:      General: No scleral icterus  Right eye: No discharge  Left eye: No discharge  Conjunctiva/sclera: Conjunctivae normal       Comments: No pallor noted   Cardiovascular:      Rate and Rhythm: Normal rate and regular rhythm  Heart sounds: Normal heart sounds  No murmur heard  No friction rub  No gallop  Pulmonary:      Effort: Pulmonary effort is normal  No respiratory distress  Breath sounds: Normal breath sounds  No wheezing or rales  Abdominal:      General: Bowel sounds are normal  There is no distension  Palpations: Abdomen is soft  There is no mass  Tenderness: There is no abdominal tenderness  There is no guarding  Musculoskeletal:         General: No tenderness or deformity  Normal range of motion  Cervical back: Normal range of motion  Right lower leg: No edema  Left lower leg: No edema  Skin:     General: Skin is warm and dry     Neurological: General: No focal deficit present  Mental Status: She is alert and oriented to person, place, and time            Nino Jimenez MD   11/10/2022

## 2022-11-10 ENCOUNTER — HOSPITAL ENCOUNTER (OUTPATIENT)
Dept: INFUSION CENTER | Facility: HOSPITAL | Age: 42
End: 2022-11-10

## 2022-11-10 ENCOUNTER — OFFICE VISIT (OUTPATIENT)
Dept: FAMILY MEDICINE CLINIC | Facility: CLINIC | Age: 42
End: 2022-11-10

## 2022-11-10 VITALS
SYSTOLIC BLOOD PRESSURE: 94 MMHG | RESPIRATION RATE: 18 BRPM | BODY MASS INDEX: 40.04 KG/M2 | WEIGHT: 205 LBS | HEART RATE: 67 BPM | OXYGEN SATURATION: 97 % | TEMPERATURE: 97.5 F | DIASTOLIC BLOOD PRESSURE: 60 MMHG

## 2022-11-10 VITALS
TEMPERATURE: 98 F | OXYGEN SATURATION: 98 % | DIASTOLIC BLOOD PRESSURE: 63 MMHG | SYSTOLIC BLOOD PRESSURE: 108 MMHG | HEART RATE: 75 BPM

## 2022-11-10 DIAGNOSIS — N93.9 ABNORMAL UTERINE BLEEDING (AUB): Primary | ICD-10-CM

## 2022-11-10 DIAGNOSIS — K59.09 OTHER CONSTIPATION: ICD-10-CM

## 2022-11-10 DIAGNOSIS — D50.8 OTHER IRON DEFICIENCY ANEMIA: Primary | ICD-10-CM

## 2022-11-10 RX ORDER — SODIUM CHLORIDE 9 MG/ML
20 INJECTION, SOLUTION INTRAVENOUS ONCE
Status: CANCELLED | OUTPATIENT
Start: 2022-11-17

## 2022-11-10 RX ORDER — SODIUM CHLORIDE 9 MG/ML
20 INJECTION, SOLUTION INTRAVENOUS ONCE
Status: COMPLETED | OUTPATIENT
Start: 2022-11-10 | End: 2022-11-10

## 2022-11-10 RX ADMIN — IRON SUCROSE 200 MG: 20 INJECTION, SOLUTION INTRAVENOUS at 13:39

## 2022-11-10 RX ADMIN — SODIUM CHLORIDE 20 ML/HR: 0.9 INJECTION, SOLUTION INTRAVENOUS at 13:37

## 2022-11-17 ENCOUNTER — HOSPITAL ENCOUNTER (OUTPATIENT)
Dept: INFUSION CENTER | Facility: HOSPITAL | Age: 42
End: 2022-11-17

## 2022-11-17 VITALS
TEMPERATURE: 97.5 F | HEART RATE: 74 BPM | SYSTOLIC BLOOD PRESSURE: 103 MMHG | RESPIRATION RATE: 16 BRPM | OXYGEN SATURATION: 98 % | DIASTOLIC BLOOD PRESSURE: 57 MMHG

## 2022-11-17 DIAGNOSIS — N93.9 ABNORMAL UTERINE BLEEDING (AUB): Primary | ICD-10-CM

## 2022-11-17 DIAGNOSIS — D50.8 OTHER IRON DEFICIENCY ANEMIA: ICD-10-CM

## 2022-11-17 DIAGNOSIS — T78.40XA ALLERGY, INITIAL ENCOUNTER: Primary | ICD-10-CM

## 2022-11-17 RX ORDER — DIPHENHYDRAMINE HCL 25 MG
25 TABLET ORAL ONCE
Status: COMPLETED | OUTPATIENT
Start: 2022-11-17 | End: 2022-11-17

## 2022-11-17 RX ORDER — SODIUM CHLORIDE 9 MG/ML
20 INJECTION, SOLUTION INTRAVENOUS ONCE
Status: CANCELLED | OUTPATIENT
Start: 2022-11-17

## 2022-11-17 RX ORDER — SODIUM CHLORIDE 9 MG/ML
20 INJECTION, SOLUTION INTRAVENOUS ONCE
Status: COMPLETED | OUTPATIENT
Start: 2022-11-17 | End: 2022-11-17

## 2022-11-17 RX ORDER — DIPHENHYDRAMINE HCL 25 MG
25 TABLET ORAL EVERY 6 HOURS PRN
Qty: 30 TABLET | Refills: 0 | Status: SHIPPED | OUTPATIENT
Start: 2022-11-17

## 2022-11-17 RX ORDER — DIPHENHYDRAMINE HCL 25 MG
25 CAPSULE ORAL ONCE
Status: CANCELLED
Start: 2022-11-17 | End: 2022-11-17

## 2022-11-17 RX ADMIN — SODIUM CHLORIDE 20 ML/HR: 0.9 INJECTION, SOLUTION INTRAVENOUS at 14:31

## 2022-11-17 RX ADMIN — DIPHENHYDRAMINE HCL 25 MG: 25 TABLET ORAL at 14:48

## 2022-11-17 RX ADMIN — SODIUM CHLORIDE 200 MG: 9 INJECTION, SOLUTION INTRAVENOUS at 14:49

## 2022-11-17 NOTE — PROGRESS NOTES
Pt arrived today, used , patient stated she has been itching from the venofer  Spoke with Alirio Bowser MD and benadryl 25mg po ordered for today  Patient stated didn't drive  Tolerated venofer without complications  Today was patient's last dose

## 2022-11-30 NOTE — PROGRESS NOTES
Assessment     Sonya Martinez is a 46 y o  No obstetric history on file  with normal gynecologic exam   bettina [id number] used for visit today  Plan     1  Routine well woman exam done today  2   Pap and HPV:Pap with HPV was done today  Current ASCCP Guidelines reviewed  3   The patient accepted STD testing  chlamydia, gonorrhea and HIV testing performed  Safe sex practices have been discussed  4  The patient is not sexually active  She refused contraception and options have been discussed  5  The following were reviewed in today's visit: breast self exam, STD testing, HIV risk factors and prevention, exercise and healthy diet  6  Patient to return to office in 3 months for regular follow up  Subjective     Sonya Martinez is a 46 y o  female who presents for annual well woman exam  Periods are regular every 28-30 days, lasting 7 days, with 4 days of heavier bleeding  LMP November 22, 2022  GYN:  · No vaginal discharge, labial erythema or lesions, dyspareunia  · Menses are regular, q 28-30 days, lasting 7 days  Was previously having irregular bleeding back in March which has now resolved  · Contraception: None  · Patient is not currently sexually active (last 10 months ago)      :  · No dysuria, urinary frequency or urgency  · No hematuria, flank pain, incontinence  Breast:  · No breast mass, skin changes, dimpling, reddening, nipple retraction  · No breast discharge  · Patient does not have a family history of breast, endometrial, or ovarian ca  General:  · Diet: Good  · Exercise: None  · Work: packaging  · ETOH use: None  · Tobacco use: None  · Recreational drug use: None    Screening:  · Cervical cancer: last pap smear unknown  · Breast cancer: not yet indicated  · Colon cancer: not yet indicated  · STD screening: ordered  Review of Systems  Pertinent items are noted in HPI        Objective      /70 (BP Location: Left arm, Patient Position: Sitting, Cuff Size: Large)   Pulse 74   Temp 97 7 °F (36 5 °C) (Temporal)   Resp 18   Wt 93 kg (205 lb)   SpO2 97%   BMI 40 04 kg/m²     Physical Exam  Vitals reviewed  Exam conducted with a chaperone present  Constitutional:       General: She is not in acute distress  Appearance: She is well-developed  She is not diaphoretic  HENT:      Head: Normocephalic and atraumatic  Eyes:      Conjunctiva/sclera: Conjunctivae normal    Cardiovascular:      Rate and Rhythm: Normal rate and regular rhythm  Heart sounds: Normal heart sounds  No murmur heard  No friction rub  No gallop  Pulmonary:      Effort: Pulmonary effort is normal  No respiratory distress  Breath sounds: Normal breath sounds  No wheezing or rales  Abdominal:      General: Bowel sounds are normal  There is no distension  Palpations: Abdomen is soft  There is no mass  Tenderness: There is no abdominal tenderness  There is no guarding  Genitourinary:     General: Normal vulva  Exam position: Lithotomy position  Pubic Area: No rash or pubic lice  Cecil stage (genital): 5  Labia:         Right: No rash, tenderness, lesion or injury  Left: No rash, tenderness, lesion or injury  Vagina: No signs of injury and foreign body  No vaginal discharge, erythema, tenderness, bleeding, lesions or prolapsed vaginal walls  Cervix: No cervical motion tenderness, discharge, friability, lesion, erythema or cervical bleeding  Adnexa:         Right: No tenderness  Left: No tenderness  Comments: Normal white vaginal discharge  Musculoskeletal:         General: No tenderness or deformity  Normal range of motion  Cervical back: Normal range of motion  Right lower leg: No edema  Left lower leg: No edema  Skin:     General: Skin is warm and dry  Neurological:      General: No focal deficit present        Mental Status: She is alert and oriented to person, place, and time           Nino Jimenez MD  12/2/2022

## 2022-12-02 ENCOUNTER — ANNUAL EXAM (OUTPATIENT)
Dept: FAMILY MEDICINE CLINIC | Facility: CLINIC | Age: 42
End: 2022-12-02

## 2022-12-02 VITALS
RESPIRATION RATE: 18 BRPM | SYSTOLIC BLOOD PRESSURE: 104 MMHG | TEMPERATURE: 97.7 F | DIASTOLIC BLOOD PRESSURE: 70 MMHG | OXYGEN SATURATION: 97 % | HEART RATE: 74 BPM | WEIGHT: 205 LBS | BODY MASS INDEX: 40.04 KG/M2

## 2022-12-02 DIAGNOSIS — Z11.51 ENCOUNTER FOR SCREENING FOR HUMAN PAPILLOMAVIRUS (HPV): ICD-10-CM

## 2022-12-02 DIAGNOSIS — Z12.4 CERVICAL CANCER SCREENING: ICD-10-CM

## 2022-12-02 DIAGNOSIS — Z01.419 ENCOUNTER FOR ANNUAL ROUTINE GYNECOLOGICAL EXAMINATION: Primary | ICD-10-CM

## 2022-12-02 DIAGNOSIS — Z11.3 SCREEN FOR STD (SEXUALLY TRANSMITTED DISEASE): ICD-10-CM

## 2022-12-05 LAB
HPV HR 12 DNA CVX QL NAA+PROBE: NEGATIVE
HPV16 DNA CVX QL NAA+PROBE: NEGATIVE
HPV18 DNA CVX QL NAA+PROBE: NEGATIVE

## 2022-12-06 LAB
C TRACH DNA SPEC QL NAA+PROBE: NEGATIVE
N GONORRHOEA DNA SPEC QL NAA+PROBE: NEGATIVE

## 2022-12-09 LAB
LAB AP GYN PRIMARY INTERPRETATION: NORMAL
Lab: NORMAL

## 2022-12-14 ENCOUNTER — OFFICE VISIT (OUTPATIENT)
Dept: OBGYN CLINIC | Facility: CLINIC | Age: 42
End: 2022-12-14

## 2022-12-14 VITALS
WEIGHT: 210 LBS | BODY MASS INDEX: 37.21 KG/M2 | HEART RATE: 94 BPM | SYSTOLIC BLOOD PRESSURE: 116 MMHG | HEIGHT: 63 IN | DIASTOLIC BLOOD PRESSURE: 68 MMHG

## 2022-12-14 DIAGNOSIS — N92.0 MENORRHAGIA WITH REGULAR CYCLE: Primary | ICD-10-CM

## 2022-12-14 RX ORDER — TRANEXAMIC ACID 650 MG/1
1300 TABLET ORAL 3 TIMES DAILY
Qty: 60 TABLET | Refills: 1 | Status: SHIPPED | OUTPATIENT
Start: 2022-12-14 | End: 2022-12-17

## 2022-12-14 NOTE — PROGRESS NOTES
Subjective:     Beryl Griffiths is a 46 y o  who presents for follow up of irregular bleeding  When she was seen at her last visit on 9/28/22, her periods were more regular and much lighter at that time  She has been receiving Venofer infusions  Since then, she reports that her periods continue to be very regular and has heavier bleeding the first 2 days  She is not currently sexually active and is not interested in initiating any contraception  Objective:    Vitals: Blood pressure 116/68, pulse 94, height 5' 3" (1 6 m), weight 95 3 kg (210 lb), last menstrual period 11/22/2022  Body mass index is 37 2 kg/m²  Physical Exam  Constitutional:       General: She is not in acute distress  Appearance: She is not ill-appearing or toxic-appearing  HENT:      Head: Normocephalic and atraumatic  Pulmonary:      Effort: Pulmonary effort is normal  No respiratory distress  Skin:     General: Skin is warm and dry  Neurological:      General: No focal deficit present  Mental Status: She is alert and oriented to person, place, and time  Mental status is at baseline  Psychiatric:         Mood and Affect: Mood normal          Thought Content: Thought content normal          Judgment: Judgment normal               Assessment/Plan:    1  Menorrhagia with regular cycle  · Patient is not interested in initiating any hormonal treatment  We discussed the use of Lysteda on the   heaviest days of her cycle and she woulld like to try this  · Rx sent for Lysteda 1300 mg TID for first 1-2 days   · Tylenol and heating pad for cramps, hold on motrin for now until evaluated by GI given gastric concerns     2  Anemia  · Patient being followed by PCP for iron deficiency anemia, repeat CBC and iron panel to be done 6-8 weeks after last Venofer infusion   Will follow up with GI for colonoscopy/EGD to assess for any further causes of anemia        Compa Ornelas MD  12/14/2022  4:48 PM

## 2022-12-21 ENCOUNTER — RA CDI HCC (OUTPATIENT)
Dept: OTHER | Facility: HOSPITAL | Age: 42
End: 2022-12-21

## 2022-12-21 NOTE — PROGRESS NOTES
[facility]  coding opportunities       Chart reviewed, no opportunity found: CHART REVIEWED, NO OPPORTUNITY FOUND        Patients Insurance        Commercial Insurance: [employer]

## 2022-12-29 ENCOUNTER — HOSPITAL ENCOUNTER (EMERGENCY)
Facility: HOSPITAL | Age: 42
Discharge: HOME | End: 2022-12-29
Attending: EMERGENCY MEDICINE

## 2022-12-29 ENCOUNTER — APPOINTMENT (EMERGENCY)
Dept: RADIOLOGY | Facility: HOSPITAL | Age: 42
End: 2022-12-29

## 2022-12-29 VITALS
RESPIRATION RATE: 18 BRPM | BODY MASS INDEX: 37.04 KG/M2 | HEART RATE: 86 BPM | DIASTOLIC BLOOD PRESSURE: 83 MMHG | WEIGHT: 209.1 LBS | SYSTOLIC BLOOD PRESSURE: 163 MMHG | TEMPERATURE: 99.4 F | OXYGEN SATURATION: 98 %

## 2022-12-29 DIAGNOSIS — S43.491A OTHER SPRAIN OF RIGHT SHOULDER JOINT, INITIAL ENCOUNTER: Primary | ICD-10-CM

## 2022-12-29 RX ORDER — NAPROXEN 375 MG/1
375 TABLET ORAL 2 TIMES DAILY WITH MEALS
Qty: 20 TABLET | Refills: 0 | Status: SHIPPED | OUTPATIENT
Start: 2022-12-29

## 2022-12-29 RX ORDER — KETOROLAC TROMETHAMINE 30 MG/ML
15 INJECTION, SOLUTION INTRAMUSCULAR; INTRAVENOUS ONCE
Status: COMPLETED | OUTPATIENT
Start: 2022-12-29 | End: 2022-12-29

## 2022-12-29 RX ADMIN — KETOROLAC TROMETHAMINE 15 MG: 30 INJECTION, SOLUTION INTRAMUSCULAR; INTRAVENOUS at 12:49

## 2022-12-29 NOTE — Clinical Note
Carolyn Turner was seen and treated in our emergency department on 12/29/2022  Diagnosis:     Maria Teresa Sprague  may return to work on return date  She may return on this date: 01/02/2023         If you have any questions or concerns, please don't hesitate to call        Do Oneill MD    ______________________________           _______________          _______________  Hospital Representative                              Date                                Time

## 2022-12-29 NOTE — ED PROVIDER NOTES
History  Chief Complaint   Patient presents with   • Shoulder Pain     R shoulder pain that started Tuesday, pain started to worsen yesterday  Ibuprofen at 80      42-year-old female present emerged department with right shoulder pain that started 2 to 3 days ago  Patient notes that she was working, twisting things and started having this pain  Since then she has been unable to lift her arm past 90 degrees  She has trouble reaching to open up her bra clasp with that arm  No nausea vomit diarrhea pain no chest pain or shortness breath  No fall or overt trauma  History provided by:  Patient  Shoulder Pain  Location:  Shoulder  Shoulder location:  R shoulder  Injury: no    Pain details:     Quality:  Aching  Handedness:  Right-handed  Dislocation: no    Associated symptoms: no back pain and no fever        Prior to Admission Medications   Prescriptions Last Dose Informant Patient Reported? Taking? diphenhydrAMINE (BENADRYL) 25 mg tablet   No No   Sig: Take 1 tablet (25 mg total) by mouth every 6 (six) hours as needed for itching   pantoprazole (PROTONIX) 40 mg tablet   No No   Sig: TAKE 1 TABLET BY MOUTH EVERY DAY   polyethylene glycol (GOLYTELY) 4000 mL solution   No No   Sig: Take 4,000 mL by mouth once for 1 dose      Facility-Administered Medications: None       Past Medical History:   Diagnosis Date   • Anemia    • Yeast infection 8/25/2022       Past Surgical History:   Procedure Laterality Date   • TUBAL LIGATION         Family History   Problem Relation Age of Onset   • Diabetes Mother    • Diabetes Father    • Colon cancer Neg Hx    • Colon polyps Neg Hx    • Stomach cancer Neg Hx      I have reviewed and agree with the history as documented      E-Cigarette/Vaping   • E-Cigarette Use Never User      E-Cigarette/Vaping Substances     Social History     Tobacco Use   • Smoking status: Never   • Smokeless tobacco: Never   Vaping Use   • Vaping Use: Never used   Substance Use Topics   • Alcohol use: Not Currently   • Drug use: Never       Review of Systems   Constitutional: Negative for chills and fever  HENT: Negative for ear pain and sore throat  Eyes: Negative for pain and visual disturbance  Respiratory: Negative for cough and shortness of breath  Cardiovascular: Negative for chest pain and palpitations  Gastrointestinal: Negative for abdominal pain and vomiting  Genitourinary: Negative for dysuria and hematuria  Musculoskeletal: Positive for arthralgias  Negative for back pain  Skin: Negative for color change and rash  Neurological: Negative for seizures and syncope  All other systems reviewed and are negative  Physical Exam  Physical Exam  Vitals and nursing note reviewed  Constitutional:       General: She is not in acute distress  Appearance: She is well-developed  HENT:      Head: Normocephalic and atraumatic  Nose: Nose normal       Mouth/Throat:      Mouth: Mucous membranes are moist    Eyes:      Conjunctiva/sclera: Conjunctivae normal    Cardiovascular:      Rate and Rhythm: Normal rate and regular rhythm  Heart sounds: No murmur heard  Pulmonary:      Effort: Pulmonary effort is normal  No respiratory distress  Breath sounds: Normal breath sounds  Abdominal:      Palpations: Abdomen is soft  Tenderness: There is no abdominal tenderness  Musculoskeletal:         General: No swelling  Cervical back: Neck supple  Comments: Right shoulder with positive Neer sign  Reduced range of motion with abduction limited to 90 degrees  Skin:     General: Skin is warm and dry  Capillary Refill: Capillary refill takes less than 2 seconds  Neurological:      Mental Status: She is alert and oriented to person, place, and time     Psychiatric:         Mood and Affect: Mood normal          Vital Signs  ED Triage Vitals   Temperature Pulse Respirations Blood Pressure SpO2   12/29/22 1222 12/29/22 1222 12/29/22 1222 12/29/22 1222 12/29/22 1222 99 4 °F (37 4 °C) 86 18 163/83 98 %      Temp Source Heart Rate Source Patient Position - Orthostatic VS BP Location FiO2 (%)   12/29/22 1222 12/29/22 1222 12/29/22 1222 12/29/22 1222 --   Oral Monitor Sitting Left arm       Pain Score       12/29/22 1249       7           Vitals:    12/29/22 1222   BP: 163/83   Pulse: 86   Patient Position - Orthostatic VS: Sitting         Visual Acuity      ED Medications  Medications   ketorolac (TORADOL) injection 15 mg (15 mg Intramuscular Given 12/29/22 1249)       Diagnostic Studies  Results Reviewed     None                 XR shoulder 2+ views RIGHT   Final Result by Jaya Herzog MD (12/29 1415)      No acute osseous abnormality or joint space narrowing  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 No Filed at: 12/29/2022 1233                    MDM  Number of Diagnoses or Management Options  Other sprain of right shoulder joint, initial encounter  Diagnosis management comments: Right rotator cuff sprain, will place in sling for 2 to 3 days  Orthopedics follow-up  NSAIDs as needed for pain  Rest recommended        Disposition  Final diagnoses:   Other sprain of right shoulder joint, initial encounter     Time reflects when diagnosis was documented in both MDM as applicable and the Disposition within this note     Time User Action Codes Description Comment    12/29/2022  1:33 PM Angélica Swann Add [[id number]] Other sprain of right shoulder joint, initial encounter       ED Disposition     ED Disposition   Discharge    Condition   Stable    Date/Time   Thu Dec 29, 2022 1:32 PM    Comment   Sonyaia Sarahi MurilloReyes discharge to home/self care  Follow-up Information     Follow up With Specialties Details Why Contact Info Additional Information    [address] [facility] [address] [facility]   [address]  [place] [address] [address] [phone] [address] [facility] [address], [address], [address] [facility] [address], [address], [address] [facility], [address] [phone]          Discharge Medication List as of 12/29/2022  1:36 PM      START taking these medications    Details   naproxen (NAPROSYN) 375 mg tablet Take 1 tablet (375 mg total) by mouth 2 (two) times a day with meals, Starting u 12/29/2022, Normal         CONTINUE these medications which have NOT CHANGED    Details   diphenhydrAMINE (BENADRYL) 25 mg tablet Take 1 tablet (25 mg total) by mouth every 6 (six) hours as needed for itching, Starting u 11/17/2022, Normal      pantoprazole (PROTONIX) 40 mg tablet TAKE 1 TABLET BY MOUTH EVERY DAY, Normal      polyethylene glycol (GOLYTELY) 4000 mL solution Take 4,000 mL by mouth once for 1 dose, Starting Fri 10/28/2022, Normal             No discharge procedures on file      PDMP Review     None          ED Provider  Electronically Signed by           Luis Thompson MD  12/29/22 2903

## 2022-12-31 ENCOUNTER — HOSPITAL ENCOUNTER (EMERGENCY)
Facility: HOSPITAL | Age: 42
Discharge: HOME | End: 2022-12-31
Attending: EMERGENCY MEDICINE

## 2022-12-31 VITALS
DIASTOLIC BLOOD PRESSURE: 68 MMHG | TEMPERATURE: 98.6 F | RESPIRATION RATE: 20 BRPM | SYSTOLIC BLOOD PRESSURE: 120 MMHG | WEIGHT: 206.1 LBS | OXYGEN SATURATION: 99 % | HEART RATE: 70 BPM | BODY MASS INDEX: 36.51 KG/M2

## 2022-12-31 DIAGNOSIS — S43.491S OTHER SPRAIN OF RIGHT SHOULDER JOINT, SEQUELA: Primary | ICD-10-CM

## 2022-12-31 RX ORDER — PREDNISONE 50 MG/1
50 TABLET ORAL DAILY
Qty: 4 TABLET | Refills: 0 | Status: SHIPPED | OUTPATIENT
Start: 2022-12-31 | End: 2023-01-04

## 2022-12-31 RX ORDER — TRAMADOL HYDROCHLORIDE 50 MG/1
50 TABLET ORAL EVERY 8 HOURS PRN
Qty: 15 TABLET | Refills: 0 | Status: SHIPPED | OUTPATIENT
Start: 2022-12-31 | End: 2023-01-05

## 2022-12-31 RX ORDER — TRAMADOL HYDROCHLORIDE 50 MG/1
50 TABLET ORAL ONCE
Status: COMPLETED | OUTPATIENT
Start: 2022-12-31 | End: 2022-12-31

## 2022-12-31 RX ADMIN — PREDNISONE 50 MG: 20 TABLET ORAL at 09:11

## 2022-12-31 RX ADMIN — TRAMADOL HYDROCHLORIDE 50 MG: 50 TABLET, COATED ORAL at 09:12

## 2022-12-31 NOTE — ED PROVIDER NOTES
History  Chief Complaint   Patient presents with   • Arm Pain     Right arm pain  Was seen for same on Thursday  States pain is worse and is unable to sleep  Has been taking meds she was prescribed, however they are not helping  Last dose was 6am this morning  Appt with ortho Thurs 35       5year-old female present emerged department with worsening right arm pain  Patient was seen 2 days ago with right shoulder pain  Patient notes that she is been wearing the sling as prescribed as well as taking naproxen as prescribed but pain is getting worse  Patient has orthopedics appointment on 5 January which is 5 days from now but unable to sleep due to the pain  Unable to do the exercises are recommended secondary to intense pain that is not improved  Patient did have a delay in starting naproxen due to inavailability at the pharmacy  History provided by:  Patient  Arm Pain  Associated symptoms: no abdominal pain, no chest pain, no cough, no ear pain, no fever, no rash, no shortness of breath, no sore throat and no vomiting        Prior to Admission Medications   Prescriptions Last Dose Informant Patient Reported? Taking?    diphenhydrAMINE (BENADRYL) 25 mg tablet   No No   Sig: Take 1 tablet (25 mg total) by mouth every 6 (six) hours as needed for itching   naproxen (NAPROSYN) 375 mg tablet   No No   Sig: Take 1 tablet (375 mg total) by mouth 2 (two) times a day with meals   pantoprazole (PROTONIX) 40 mg tablet   No No   Sig: TAKE 1 TABLET BY MOUTH EVERY DAY   polyethylene glycol (GOLYTELY) 4000 mL solution   No No   Sig: Take 4,000 mL by mouth once for 1 dose      Facility-Administered Medications: None       Past Medical History:   Diagnosis Date   • Anemia    • Yeast infection 8/25/2022       Past Surgical History:   Procedure Laterality Date   • TUBAL LIGATION         Family History   Problem Relation Age of Onset   • Diabetes Mother    • Diabetes Father    • Colon cancer Neg Hx    • Colon polyps Neg Hx    • Stomach cancer Neg Hx      I have reviewed and agree with the history as documented  E-Cigarette/Vaping   • E-Cigarette Use Never User      E-Cigarette/Vaping Substances     Social History     Tobacco Use   • Smoking status: Never   • Smokeless tobacco: Never   Vaping Use   • Vaping Use: Never used   Substance Use Topics   • Alcohol use: Not Currently   • Drug use: Never       Review of Systems   Constitutional: Negative for chills and fever  HENT: Negative for ear pain and sore throat  Eyes: Negative for pain and visual disturbance  Respiratory: Negative for cough and shortness of breath  Cardiovascular: Negative for chest pain and palpitations  Gastrointestinal: Negative for abdominal pain and vomiting  Genitourinary: Negative for dysuria and hematuria  Musculoskeletal: Positive for arthralgias  Negative for back pain  Skin: Negative for color change and rash  Neurological: Negative for seizures and syncope  All other systems reviewed and are negative  Physical Exam  Physical Exam  Vitals and nursing note reviewed  Constitutional:       General: She is not in acute distress  Appearance: She is well-developed  HENT:      Head: Normocephalic and atraumatic  Eyes:      Conjunctiva/sclera: Conjunctivae normal    Cardiovascular:      Rate and Rhythm: Normal rate and regular rhythm  Heart sounds: No murmur heard  Pulmonary:      Effort: Pulmonary effort is normal  No respiratory distress  Breath sounds: Normal breath sounds  Abdominal:      Palpations: Abdomen is soft  Tenderness: There is no abdominal tenderness  Musculoskeletal:         General: No swelling  Cervical back: Neck supple  Comments: Right shoulder with diffuse tenderness palpation without erythema or edema  Extremely reduced range of motion due to severe pain with any ranging  Skin:     General: Skin is warm and dry  Capillary Refill: Capillary refill takes less than 2 seconds  Neurological:      Mental Status: She is alert  Psychiatric:         Mood and Affect: Mood normal          Vital Signs  ED Triage Vitals [12/31/22 0844]   Temperature Pulse Respirations Blood Pressure SpO2   98 6 °F (37 °C) 70 20 120/68 99 %      Temp Source Heart Rate Source Patient Position - Orthostatic VS BP Location FiO2 (%)   Oral Monitor Sitting Left arm --      Pain Score       10 - Worst Possible Pain           Vitals:    12/31/22 0844   BP: 120/68   Pulse: 70   Patient Position - Orthostatic VS: Sitting         Visual Acuity      ED Medications  Medications   traMADol (ULTRAM) tablet 50 mg (has no administration in time range)   predniSONE tablet 50 mg (has no administration in time range)       Diagnostic Studies  Results Reviewed     None                 No orders to display              Procedures  Procedures         ED Course                                             MDM  Number of Diagnoses or Management Options  Other sprain of right shoulder joint, sequela  Diagnosis management comments: Rotator cuff injury of the right shoulder  Continue sling for another day  We will start patient on prednisone and tramadol  Disposition  Final diagnoses:   Other sprain of right shoulder joint, sequela     Time reflects when diagnosis was documented in both MDM as applicable and the Disposition within this note     Time User Action Codes Description Comment    12/31/2022  9:02 AM Fanny Rich Add [[id number]] Other sprain of right shoulder joint, sequela       ED Disposition     ED Disposition   Discharge    Condition   Stable    Date/Time   Sat Dec 31, 2022  9:01 AM    Comment   Necia Flash MurilloReyes discharge to home/self care                 Follow-up Information     Follow up With Specialties Details Why Contact Info Additional [address]   [address], [address] [facility] [address]  [address] [facility], [address] [place] [address], [address] [place], [place], [address] [facility] [address]          Patient's Medications   Discharge Prescriptions    PREDNISONE 50 MG TABLET    Take 1 tablet (50 mg total) by mouth daily for 4 days       Start Date: 12/31/2022End Date: 1/4/2023       Order Dose: 50 mg       Quantity: 4 tablet    Refills: 0    TRAMADOL (ULTRAM) 50 MG TABLET    Take 1 tablet (50 mg total) by mouth every 8 (eight) hours as needed for moderate pain for up to 5 days       Start Date: 12/31/2022End Date: 1/5/2023       Order Dose: 50 mg       Quantity: 15 tablet    Refills: 0       No discharge procedures on file      PDMP Review     None          ED Provider  Electronically Signed by           Marquise Sheppard MD  12/31/22 Fátima Grayson MD  12/31/22 6039

## 2022-12-31 NOTE — Clinical Note
Vivianecarlton Alexandra was seen and treated in our emergency department on 12/31/2022  Diagnosis:     Kimber Medrano  may return to work on return date  She may return on this date: 01/06/2023         If you have any questions or concerns, please don't hesitate to call        Josseline Jones MD    ______________________________           _______________          _______________  Hospital Representative                              Date                                Time

## 2023-01-05 ENCOUNTER — OFFICE VISIT (OUTPATIENT)
Dept: OBGYN CLINIC | Facility: MEDICAL CENTER | Age: 43
End: 2023-01-05

## 2023-01-05 VITALS
HEART RATE: 80 BPM | WEIGHT: 204 LBS | SYSTOLIC BLOOD PRESSURE: 113 MMHG | BODY MASS INDEX: 36.14 KG/M2 | DIASTOLIC BLOOD PRESSURE: 69 MMHG | HEIGHT: 63 IN

## 2023-01-05 DIAGNOSIS — M25.511 ACUTE PAIN OF RIGHT SHOULDER: Primary | ICD-10-CM

## 2023-01-05 DIAGNOSIS — M75.81 TENDINITIS OF RIGHT ROTATOR CUFF: ICD-10-CM

## 2023-01-05 RX ADMIN — TRIAMCINOLONE ACETONIDE 40 MG: 40 INJECTION, SUSPENSION INTRA-ARTICULAR; INTRAMUSCULAR at 15:54

## 2023-01-05 RX ADMIN — BUPIVACAINE HYDROCHLORIDE 3.5 ML: 5 INJECTION, SOLUTION PERINEURAL at 15:54

## 2023-01-05 NOTE — LETTER
January 5, 2023     Patient: Jeanine Bliss MurilloReyes  YOB: 1980  Date of Visit: 1/5/2023      To Whom it May Concern:    Radha Alexander is under my professional care  Maria G Flaherty was seen in my office on 1/5/2023  Work excuse until next appointment in 3 weeks  If you have any questions or concerns, please don't hesitate to call           Sincerely,          Bishop Sosa MD        CC: No Recipients

## 2023-01-05 NOTE — PATIENT INSTRUCTIONS
You may use Advil (ibuprofen) 600mg every 6 hours or at least twice per day OR Aleve (naproxen) 250-500mg every 12 hours as needed for pain and inflammation  You may also take Tylenol 500mg every 4-6 hours as needed OR max 1,000mg per dose up to 3 times per day for a total of 3,000mg per day  Check with your primary care physician to see if these medications are safe to take and to make sure they do not interfere with your other medications and medical issues  Inyección de esteroides en la articulación   CUIDADO AMBULATORIO:   Qué debe saber sobre las inyecciones de esteroides en las articulaciones: Brown inyección de esteroides en brown articulación es un procedimiento para inyectar un medicamento esteroide en brown articulación  Los medicamentos esteroides alivian el dolor y la inflamación  Es posible que la inyección también contenga un anestésico (medicamento para que usted pierda la sensación) para aliviar el dolor  Se pueden administrar estas inyecciones para el tratamiento de [employer] artritis, gota o síndrome del túnel carpiano  Podrían administrarle brown inyección en la rodilla, el tobillo, el hombro, el codo o la [place] 1  Las inyecciones también pueden aplicarse en boo cadera o en los dedos de las romeo o los pies  Cómo debe prepararse para recibir brown inyección de esteroides en la articulación: Boo médico hablará con usted sobre cómo prepararse para nel procedimiento  Le dirán qué medicamentos mildred o no mildred el día de boo procedimiento  Es posible que deba suspender el uso de anticoagulantes varios días antes del procedimiento  Qué sucederá rene la administración de la inyección de esteroides en la articulación: Es posible que le den anestesia local para adormecer el área donde le aplicarán la inyección  Con la anestesia local, usted todavía podría sentir presión rene el procedimiento, erlin no debería sentir ningún dolor   Boo médico podría hacer un ultrasonido o fluoroscopía (un tipo de radiografía) para guiar la aguja hasta el lugar apropiado  Luego le inyectará el medicamento esteroide en la articulación  Se colocará un vendaje en el sitio de la inyección  Qué sucederá después de que le administren la inyección de esteroides en la articulación: Es posible que boo yossi o boo pecho se pongan de color sloan, calientes o sudorosos después de la inyección  Los esteroides pueden afectar el nivel de azúcar en la osmel  Si tiene diabetes, cuide detenidamente el nivel de azúcar en boo [place] primeras 24 horas después del procedimiento  Riesgos que acarrea brown inyección de esteroides en la articulación: Usted podría contraer brown infección en la articulación  Podría sentir [employer] las primeras 24 a 36 horas después de la inyección  Es posible que deban administrarle más de brown inyección para sentir un alivio del dolor  La piel del lugar donde le dieron la inyección podría dañarse y perder el color o hendirse  [place] puede suceder si le administran el medicamento esteroide demasiado cerca de boo piel  Se podría dañar un tendón o un nervio cerca del lugar de la inyección  Comuníquese con boo médico si:  Usted tiene fiebre o escalofríos  El lugar donde recibió la inyección se pone de color sloan o se inflama  Usted siente más dolor que de costumbre en la articulación rene más de 72 horas  Usted tiene preguntas o inquietudes acerca de boo condición o cuidado  Medicamentos:  Los analgésicos podrían administrarse  Pregunte cómo mildred estos medicamentos de brown forma navarro  [place] hailey medicamentos moises se le haya indicado  Consulte con boo médico si usted coral que boo medicamento no le está ayudando o si presenta efectos secundarios  Infórmele si es alérgico a cualquier medicamento  Mantenga brown lista actualizada de los [place], las vitaminas y los productos herbales que dheeraj  Incluya los siguientes datos de los medicamentos: cantidad, frecuencia y motivo de administración  Traiga con usted la lista o los envases de las píldoras a hailey citas de seguimiento  Lleve la lista de los medicamentos con usted en jovon de brown emergencia  Cuidados personales:  Déjese el vendaje puesto por 8 a 12 horas  Siga las instrucciones de boo médico sobre el cuidado de hailey heridas  Repose el área según las indicaciones  Es posible que deba apoyar menos peso en ciertas articulaciones, moises la rodilla, rene cierto [place]  Pregunte cuándo puede retomar hailey actividades cotidianas  Eleve el miembro donde le administraron la inyección  Eleve la extremidad por encima del nivel de boo corazón con la mayor frecuencia posible  [place] va a disminuir inflamación y el dolor  Apoye el brazo o la pierna sobre almohadas o mantas para elevarlo con mayor comodidad  Aplique hielo en la articulación de 15 a 20 minutos cada hora o moises se le indique  Use brown compresa de hielo o ponga hielo triturado en brown bolsa de plástico  Kyra Mandy con brown toalla  El hielo ayuda a evitar daño al tejido y a disminuir la inflamación y el dolor  Acuda a la consulta de control con boo médico según las indicaciones: Anote hailey preguntas para que se acuerde de hacerlas rene hailey visitas  © Copyright [employer] 2022 Information is for End User's use only and may not be sold, redistributed or otherwise used for commercial purposes  All illustrations and images included in CareNotes® are the copyrighted property of A D A M , Inc  or [address] [place] [address] Avenue es sólo para uso en educación  Boo intención no es darle un consejo médico sobre enfermedades o tratamientos  Colsulte con boo Jennifer Lesser farmacéutico antes de seguir cualquier régimen médico para saber si es seguro y efectivo para usted

## 2023-01-05 NOTE — PROGRESS NOTES
Assessment/Plan:    Diagnoses and all orders for this visit:    Acute pain of right shoulder  -     Ambulatory Referral to Physical Therapy; Future  -     Large joint arthrocentesis: R subacromial bursa    Tendinitis of right rotator cuff  -     Ambulatory Referral to Physical Therapy; Future  -     Large joint arthrocentesis: R subacromial bursa    Subacromial steroid injection provided for the right shoulder for her gradual onset overuse injury   [id number]  Return in about 3 weeks (around 1/26/2023)  Chief Complaint:     Chief Complaint   Patient presents with   • Right Shoulder - Pain       Subjective:   Patient ID: Elza Schilder is a 38 y o  female  Patient presents for approximately 1-2 weeks of right lateral shoulder pain 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  She was evaluated in the emergency department x-rays of the right shoulder were obtained and reviewed today, Prescribed prednisone tramadol and naproxen, but these medications upset stomach thus only takes Tylenol      Review of Systems    The following portions of the patient's chart were reviewed and updated as appropriate:      Allergie  Allergies   Allergen Reactions   • Iron [Ferrous Sulfate] Abdominal Pain   s   Allergen Reactions   • Iron [Ferrous Sulfate] Abdominal Pain      Diagnosis Date   • Anemia    • Yeast infection 8/25/2022       Past Surgical History:   Procedure Laterality Date   • TUBAL LIGATION         Social History     Socioeconomic History   • Marital status: Single     Spouse name: Not on file   • Number of children: Not on file   • Years of education: Not on file   • Highest education level: Not on file   Occupational History   • Not on file   Tobacco Use   • Smoking status: Never   • Smokeless tobacco: Never   Vaping Use   • Vaping Use: Never used   Substance and Sexual Activity   • Alcohol use: Not Currently   • Drug use: Never   • Sexual activity: Not Currently   Other Topics Concern   • Not on file   Social History Narrative   • Not on file     Social Determinants of Health     Financial Resource Strain: Low Risk    • Difficulty of Paying Living Expenses: Not hard at all   Food Insecurity: No Food Insecurity   • Worried About [address] in the Last Year: Never true   • Ran Out of Food in the Last Year: Never true   Transportation Needs: No Transportation Needs   • Lack of Transportation (Medical): No   • Lack of Transportation (Non-Medical):  No   Physical Activity: Not on file   Stress: Not on file   Social Connections: Not on file   Intimate Partner Violence: Not on file   Housing Stability: Low Risk    • Unable to Pay for Housing in the Last Year: No   • Number of Places Lived in the Last Year: 1   • Unstable Housing in the Last Year: No       Family History   Problem Relation Age of Onset   • Diabetes Mother    • Diabetes Father    • Colon cancer Neg Hx    • Colon polyps Neg Hx    • Stomach cancer Neg Hx        Medications:    Current Outpatient Medications:   •  diphenhydrAMINE (BENADRYL) 25 mg tablet, Take 1 tablet (25 mg total) by mouth every 6 (six) hours as needed for itching, Disp: 30 tablet, Rfl: 0  •  naproxen (NAPROSYN) 375 mg tablet, Take 1 tablet (375 mg total) by mouth 2 (two) times a day with meals, Disp: 20 tablet, Rfl: 0  •  pantoprazole (PROTONIX) 40 mg tablet, TAKE 1 TABLET BY MOUTH EVERY DAY, Disp: 30 tablet, Rfl: 6  •  traMADol (Ultram) 50 mg tablet, Take 1 tablet (50 mg total) by mouth every 8 (eight) hours as needed for moderate pain for up to 5 days, Disp: 15 tablet, Rfl: 0  •  polyethylene glycol (GOLYTELY) 4000 mL solution, Take 4,000 mL by mouth once for 1 dose, Disp: 4000 mL, Rfl: 0    Patient Active Problem List   Diagnosis   • Class 1 obesity due to excess calories with body mass index (BMI) of 34 0 to 34 9 in adult   • Abnormal uterine bleeding (AUB)   • Iron deficiency anemia   • Epigastric pain   • Other constipation       Objective:  /69   Pulse 80   Ht 5' 3" (1 6 m)   Wt 92 5 kg (204 lb)   LMP 12/20/2022 (Exact Date)   BMI 36 14 kg/m²     Right Shoulder Exam     Range of Motion   Active abduction: abnormal   External rotation: abnormal   Internal rotation 0 degrees: abnormal     Other   Erythema: absent            Physical Exam      Neurologic Exam    Large joint arthrocentesis: R subacromial bursa  Universal Protocol:  Consent: Verbal consent obtained  Risks and benefits: risks, benefits and alternatives were discussed  Consent given by: patient  Time out: Immediately prior to procedure a "time out" was called to verify the correct patient, procedure, equipment, support staff and site/side marked as required  Timeout called at: 1/5/2023 3:53 PM   Patient understanding: patient states understanding of the procedure being performed  Test results: test results available and properly labeled  Site marked: the operative site was marked  Patient identity confirmed: verbally with patient    Supporting Documentation  Indications: pain   Procedure Details  Location: shoulder - R subacromial bursa  Preparation: Patient was prepped and draped in the usual sterile fashion  Needle size: 22 G  Ultrasound guidance: no  Approach: posterolateral  Medications administered: 40 mg triamcinolone acetonide 40 mg/mL; 3 5 mL bupivacaine 0 5 %    Patient tolerance: patient tolerated the procedure well with no immediate complications  Dressing:  Sterile dressing applied    No erythema of Shoulder(s)          I have personally reviewed pertinent films in PACS  and I have personally reviewed the written report of the pertinent studies  Xrays Right Shoulder    IMPRESSION:     No acute osseous abnormality or joint space narrowing    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

## 2023-01-12 RX ORDER — BUPIVACAINE HYDROCHLORIDE 5 MG/ML
3.5 INJECTION, SOLUTION PERINEURAL
Status: COMPLETED | OUTPATIENT
Start: 2023-01-05 | End: 2023-01-05

## 2023-01-12 RX ORDER — TRIAMCINOLONE ACETONIDE 40 MG/ML
40 INJECTION, SUSPENSION INTRA-ARTICULAR; INTRAMUSCULAR
Status: COMPLETED | OUTPATIENT
Start: 2023-01-05 | End: 2023-01-05

## 2023-01-17 ENCOUNTER — EVALUATION (OUTPATIENT)
Dept: PHYSICAL THERAPY | Facility: CLINIC | Age: 43
End: 2023-01-17

## 2023-01-17 DIAGNOSIS — M75.81 TENDINITIS OF RIGHT ROTATOR CUFF: ICD-10-CM

## 2023-01-17 DIAGNOSIS — M25.511 ACUTE PAIN OF RIGHT SHOULDER: ICD-10-CM

## 2023-01-17 NOTE — PROGRESS NOTES
PT Evaluation     Today's date: 2023  Patient name: Vita Beasley  : 1980  MRN: [id number]  Referring provider: Charissa Wharton MD  Dx:   Encounter Diagnosis     ICD-10-CM    1  Acute pain of right shoulder  M25 511 Ambulatory Referral to Physical Therapy      2  Tendinitis of right rotator cuff  M75 81 Ambulatory Referral to Physical Therapy                     Assessment  Assessment details: Vita Beasley is a 38 y o  female who presents to PT evaluation for acute R shoulder pain since 2022  Her evaluation suggestive of labral/glenoid with potential partial thickness rotator cuff tear  She demonstrates the following impairments: R shoulder pain, decreased ROM, weakness, decreased functional mobility  The listed impairments limit the individuals ability to perform the following: work related duties in a warehouse, overhead reaching, carrying, activities over 90 deg elevation  HEP was provided and reviewed  Patient is able to complete HEP with good technique and appropriate pain response  Patient expressed understanding of appropriate dosage and frequency of HEP  No additional referral necessary  Pt would benefit from skilled PT to improve upon impairments to improve QoL  Impairments: abnormal or restricted ROM, abnormal movement, activity intolerance, impaired physical strength, lacks appropriate home exercise program, pain with function and weight-bearing intolerance    Symptom irritability: highUnderstanding of Dx/Px/POC: excellent  Goals  Short Term Goals: In 2-4 weeks, the patient will:  1  Pt will demonstrate 150 deg of R shoulder elevation  2  Pt will demonstrate 0 5 point improvement in R shoulder MMT  3  Supervision with HEP for self care    Long Term Goals: At time of D/C, the patient will:  1  Pt will be able to lift 10# overhead B22 to replicate work duties  2  FOTO to greater than predicted value  3   Independent with HEP for selfcare      Plan  Patient would benefit from: skilled physical therapy  Planned modality interventions: biofeedback, cryotherapy, manual electrical stimulation, microcurrent electrical stimulation, TENS and thermotherapy: hydrocollator packs  Planned therapy interventions: activity modification, balance/weight bearing training, flexibility, functional ROM exercises, gait training, graded activity, graded exercise, home exercise program, manual therapy, joint mobilization, neuromuscular re-education, patient education, strengthening, stretching, therapeutic activities, therapeutic exercise and therapeutic training  Frequency: 2x week  Duration in visits: 10  Duration in weeks: 5  Treatment plan discussed with: patient        Subjective Evaluation    History of Present Illness  Mechanism of injury: Pt reports that on  she went to the ER for acute R shoulder pain  She notes that one day at work in November she was reaching for a light box overhead where she had a lot of shoulder pain as well as neck pain  She notes that after that she continued to work with her shoulder pain where she was filling water bottles and had increased pain with the twisting motion of capping bottles  She notes that she then had a second onset of sharp shoulder pain reaching for another small box in December  After second onset she said that she was unable to move her arm when taking her coat off leaving the freezer area, and went to the ER that same day  She notes that since ER that she had been given medication which has not had much help, but started to get pain relief after an injection from the ortho doc  She reports that she has difficulty sleeping due to her pain, and sometimes has radiating pain   Pt describes her symptoms as a "deep shoulder pain that clicks "   Quality of life: excellent    Pain  Current pain rating: 3  At best pain ratin  At worst pain rating: 10  Location: L shoudler   Quality: throbbing and radiating  Relieving factors: medications, relaxation and rest  Aggravating factors: overhead activity, keyboarding and lifting  Progression: improved      Diagnostic Tests  X-ray: abnormal    FCE comments: 12/29/2022 R shoulder x-ray IMPRESSION:   "No acute osseous abnormality or joint space narrowing  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"Treatments  Previous treatment: medication  Patient Goals  Patient goals for therapy: decreased pain, increased motion, independence with ADLs/IADLs, return to sport/leisure activities, increased strength and return to work  Patient goal: to improve her shoulder health for work        Objective  1:1 with Ruby Hubbard DPT for entirety of tx  OBJECTIVE:  Pt chavo R sling entering today's treatment       Strength and ROM evaluated B from a regional biomechanical perspective and values relevant to this episode recorded in table below    ROM: Goniometric measurement revealed the following findings  Shoulder ROM Right: Left:    Flexion 90* 180   Abduction 90* 180   FATMATA Ipsilateral AC joint T5   FIR T8 T2     Strength: MMT revealed the following findings  Joint Motion Right:  Left:    Sh  Flexion 2+/5* 5/5   Sh  Abduction 2+/5* 5/5   Sh  ER 2+/5* 5/5   Sh  IR 2+/5* 5/5   Bicep  2+/5* 5/5       Additional Assessments:  Palpation: TTP R superior labrum, supraspinatus muscle belly    Shoulder Specific Special Tests: For clinical decision making the Bart Test [facility]) was used initially: Results: IR>>ER MMT which suggests intra-articular disorder                                                                                                                                                                Test / Measure  Right:   Elio P   Painful Arc P   Infraspinatus Strength Test P   Drop Arm N   Supraspinatus (empty can) P   Neer P   Sulcus  N   Biceps Load II P   [place] Relocation P   Clunk P   Obrein's P Precautions: obesity    Pertinent Findings:                                                                                                                                                     Test / Measure  01/17/23   FOTO (Pred 69) 49   Shoulder Elevation AROM 90 deg*   R shoulder elevation MMT 2+/5   Labral Special Tests P         Manuals 1/17                                                                Neuro Re-Ed 1/17                                                                                                       Ther Ex 1/17                                                                                                                    Ther Activity 1/17                                      Gait Training 1/17                                      Modalities 1/17

## 2023-01-18 ENCOUNTER — OFFICE VISIT (OUTPATIENT)
Dept: PHYSICAL THERAPY | Facility: CLINIC | Age: 43
End: 2023-01-18

## 2023-01-18 DIAGNOSIS — M25.511 ACUTE PAIN OF RIGHT SHOULDER: Primary | ICD-10-CM

## 2023-01-18 DIAGNOSIS — M75.81 TENDINITIS OF RIGHT ROTATOR CUFF: ICD-10-CM

## 2023-01-18 NOTE — PROGRESS NOTES
Daily Note     Today's date: 2023  Patient name: Inderjit Horton  : 1980  MRN: [id number]  Referring provider: Larissa Peña MD  Dx:   Encounter Diagnosis     ICD-10-CM    1  Acute pain of right shoulder  M25 511       2  Tendinitis of right rotator cuff  M75 81                       Subjective: Patient denies sh pain at rest, but does report minimal pain with elevation  Objective: See treatment diary below    Assessment: Tolerated treatment well  Reinforced importance of avoiding increased pain while performing all exercises  Patient exhibited good technique with therapeutic exercises and would benefit from continued PT   Min c/o increased pain at end range of manual stretching  Plan: Continue per plan of care  Precautions: obesity    Pertinent Findings:                                                                                                                                                     Test / Measure  23   FOTO (Pred 69) 49   Shoulder Elevation AROM 90 deg*   R shoulder elevation MMT 2+/5   Labral Special Tests P         Manuals            PROM  [place]                                                  Neuro Re-Ed                                                                                                       Ther Ex            Pulleys  5'           Pendulums  x10 ea           Table Slides Flex/Scaption  5" x10 ea           RTC Isometrics             Rows  Red x15           Scap Retrac    5"x10                                     Ther Activity                                      Gait Training                                      Modalities

## 2023-01-19 ENCOUNTER — HOSPITAL ENCOUNTER (OUTPATIENT)
Dept: GASTROENTEROLOGY | Facility: HOSPITAL | Age: 43
Setting detail: OUTPATIENT SURGERY
Discharge: HOME | End: 2023-01-19
Attending: STUDENT IN AN ORGANIZED HEALTH CARE EDUCATION/TRAINING PROGRAM | Admitting: INTERNAL MEDICINE

## 2023-01-19 ENCOUNTER — ANESTHESIA EVENT (OUTPATIENT)
Dept: GASTROENTEROLOGY | Facility: HOSPITAL | Age: 43
End: 2023-01-19

## 2023-01-19 ENCOUNTER — ANESTHESIA (OUTPATIENT)
Dept: GASTROENTEROLOGY | Facility: HOSPITAL | Age: 43
End: 2023-01-19

## 2023-01-19 VITALS
SYSTOLIC BLOOD PRESSURE: 119 MMHG | OXYGEN SATURATION: 96 % | TEMPERATURE: 99.3 F | HEART RATE: 60 BPM | DIASTOLIC BLOOD PRESSURE: 75 MMHG | RESPIRATION RATE: 18 BRPM

## 2023-01-19 DIAGNOSIS — D64.9 ANEMIA, UNSPECIFIED TYPE: ICD-10-CM

## 2023-01-19 DIAGNOSIS — D50.8 OTHER IRON DEFICIENCY ANEMIA: Primary | ICD-10-CM

## 2023-01-19 LAB
EXT PREGNANCY TEST URINE: NEGATIVE
EXT. CONTROL: NORMAL

## 2023-01-19 RX ORDER — SODIUM CHLORIDE, SODIUM LACTATE, POTASSIUM CHLORIDE, CALCIUM CHLORIDE 600; 310; 30; 20 MG/100ML; MG/100ML; MG/100ML; MG/100ML
100 INJECTION, SOLUTION INTRAVENOUS CONTINUOUS
Status: CANCELLED | OUTPATIENT
Start: 2023-01-19

## 2023-01-19 RX ORDER — PROPOFOL 10 MG/ML
INJECTION, EMULSION INTRAVENOUS AS NEEDED
Status: DISCONTINUED | OUTPATIENT
Start: 2023-01-19 | End: 2023-01-19

## 2023-01-19 RX ORDER — SODIUM CHLORIDE, SODIUM LACTATE, POTASSIUM CHLORIDE, CALCIUM CHLORIDE 600; 310; 30; 20 MG/100ML; MG/100ML; MG/100ML; MG/100ML
100 INJECTION, SOLUTION INTRAVENOUS CONTINUOUS
Status: DISCONTINUED | OUTPATIENT
Start: 2023-01-19 | End: 2023-01-23 | Stop reason: HOSPADM

## 2023-01-19 RX ORDER — LIDOCAINE HYDROCHLORIDE 10 MG/ML
INJECTION, SOLUTION EPIDURAL; INFILTRATION; INTRACAUDAL; PERINEURAL AS NEEDED
Status: DISCONTINUED | OUTPATIENT
Start: 2023-01-19 | End: 2023-01-19

## 2023-01-19 RX ADMIN — LIDOCAINE HYDROCHLORIDE 50 MG: 10 INJECTION, SOLUTION EPIDURAL; INFILTRATION; INTRACAUDAL; PERINEURAL at 08:34

## 2023-01-19 RX ADMIN — PROPOFOL 100 MG: 10 INJECTION, EMULSION INTRAVENOUS at 08:34

## 2023-01-19 RX ADMIN — SODIUM CHLORIDE, SODIUM LACTATE, POTASSIUM CHLORIDE, AND CALCIUM CHLORIDE 100 ML/HR: .6; .31; .03; .02 INJECTION, SOLUTION INTRAVENOUS at 08:11

## 2023-01-19 RX ADMIN — PROPOFOL 50 MG: 10 INJECTION, EMULSION INTRAVENOUS at 08:37

## 2023-01-19 RX ADMIN — PROPOFOL 50 MG: 10 INJECTION, EMULSION INTRAVENOUS at 08:44

## 2023-01-19 RX ADMIN — PROPOFOL 50 MG: 10 INJECTION, EMULSION INTRAVENOUS at 08:40

## 2023-01-19 RX ADMIN — PROPOFOL 50 MG: 10 INJECTION, EMULSION INTRAVENOUS at 08:48

## 2023-01-19 RX ADMIN — PROPOFOL 50 MG: 10 INJECTION, EMULSION INTRAVENOUS at 08:53

## 2023-01-19 NOTE — ANESTHESIA PREPROCEDURE EVALUATION
Medical History  History Comments   Anemia    Yeast infection      Procedure:  COLONOSCOPY  EGD    Relevant Problems   ANESTHESIA (within normal limits)      HEMATOLOGY   (+) Iron deficiency anemia        Physical Exam    Airway    Mallampati score: II  TM Distance: >3 FB  Neck ROM: full     Dental       Cardiovascular  Rate: normal,     Pulmonary  Pulmonary exam normal     Other Findings  Per pt denies anything remaining that is loose or removeable      Anesthesia Plan  ASA Score- 2     Anesthesia Type- IV sedation with anesthesia with ASA Monitors  Additional Monitors:   Airway Plan:     Comment: Per patient, appropriately NPO, denies active CP/SOB/wheezing/symptoms related to heartburn/nausea/vomiting  Plan Factors-Exercise tolerance (METS): >4 METS  Chart reviewed  Patient summary reviewed  Patient is not a current smoker  Induction- intravenous  Postoperative Plan-     Informed Consent- Anesthetic plan and risks discussed with patient  I personally reviewed this patient with the CRNA  Discussed and agreed on the Anesthesia Plan with the CRNA  Juan J Chow

## 2023-01-19 NOTE — ANESTHESIA POSTPROCEDURE EVALUATION
Post-Op Assessment Note    CV Status:  Stable  Pain Score: 0    Pain management: adequate     Mental Status:  Alert and awake   Hydration Status:  Euvolemic   PONV Controlled:  Controlled   Airway Patency:  Patent      Post Op Vitals Reviewed: Yes      Staff: Anesthesiologist, CRNA         No notable events documented      BP   107/68   Temp  99 3   Pulse  76   Resp   16   SpO2   96%

## 2023-01-19 NOTE — H&P
H&P EXAM - Outpatient Endoscopy   Refugia Alder MurilloReyes 38 y o  female MRN: [id number]    [address]   Encounter: [id number]        History and Physical - [facility]  Refugia Alder MurilloReyes 38 y o  female MRN: [id number]                  HPI: Jp Rucker is a 38y o  year old female who presents for iron deficiency anemia, history of h pylori infection      REVIEW OF SYSTEMS: Per the HPI, and otherwise unremarkable  Historical Information   Past Medical History:   Diagnosis Date   • Anemia    • Yeast infection 8/25/2022     Past Surgical History:   Procedure Laterality Date   • TUBAL LIGATION       Social History   Social History     Substance and Sexual Activity   Alcohol Use Not Currently     Social History     Substance and Sexual Activity   Drug Use Never     Social History     Tobacco Use   Smoking Status Never   Smokeless Tobacco Never     Family History   Problem Relation Age of Onset   • Diabetes Mother    • Diabetes Father    • Colon cancer Neg Hx    • Colon polyps Neg Hx    • Stomach cancer Neg Hx        Meds/Allergies     (Not in a hospital admission)      Allergies   Allergen Reactions   • Iron [Ferrous Sulfate] Abdominal Pain       Objective     /78 (BP Location: Left arm)   Pulse 75   Temp (!) 97 4 °F (36 3 °C) (Temporal)   Resp 18   LMP 12/20/2022 (Exact Date)   SpO2 97%       PHYSICAL EXAM    Gen: NAD  CV: RRR  CHEST: Clear  ABD: soft, NT/ND  EXT: no edema      ASSESSMENT/PLAN:  This is a 38y o  year old female here for egd/colonoscopy, and she is stable and optimized for her procedure

## 2023-01-23 ENCOUNTER — OFFICE VISIT (OUTPATIENT)
Dept: PHYSICAL THERAPY | Facility: CLINIC | Age: 43
End: 2023-01-23

## 2023-01-23 ENCOUNTER — OFFICE VISIT (OUTPATIENT)
Dept: FAMILY MEDICINE CLINIC | Facility: CLINIC | Age: 43
End: 2023-01-23

## 2023-01-23 VITALS
HEIGHT: 63 IN | DIASTOLIC BLOOD PRESSURE: 70 MMHG | TEMPERATURE: 97.8 F | WEIGHT: 197.9 LBS | SYSTOLIC BLOOD PRESSURE: 104 MMHG | HEART RATE: 102 BPM | OXYGEN SATURATION: 97 % | BODY MASS INDEX: 35.07 KG/M2 | RESPIRATION RATE: 8 BRPM

## 2023-01-23 DIAGNOSIS — K59.09 OTHER CONSTIPATION: ICD-10-CM

## 2023-01-23 DIAGNOSIS — Z12.31 ENCOUNTER FOR SCREENING MAMMOGRAM FOR BREAST CANCER: ICD-10-CM

## 2023-01-23 DIAGNOSIS — D50.9 IRON DEFICIENCY ANEMIA, UNSPECIFIED IRON DEFICIENCY ANEMIA TYPE: Primary | ICD-10-CM

## 2023-01-23 DIAGNOSIS — M25.511 ACUTE PAIN OF RIGHT SHOULDER: Primary | ICD-10-CM

## 2023-01-23 DIAGNOSIS — M75.81 TENDINITIS OF RIGHT ROTATOR CUFF: ICD-10-CM

## 2023-01-23 DIAGNOSIS — D50.8 OTHER IRON DEFICIENCY ANEMIA: Primary | ICD-10-CM

## 2023-01-23 DIAGNOSIS — R10.13 EPIGASTRIC PAIN: ICD-10-CM

## 2023-01-23 RX ORDER — PANTOPRAZOLE SODIUM 40 MG/1
40 TABLET, DELAYED RELEASE ORAL DAILY
Qty: 30 TABLET | Refills: 6 | Status: SHIPPED | OUTPATIENT
Start: 2023-01-23

## 2023-01-23 NOTE — RESULT ENCOUNTER NOTE
Please call the patient with the results    There was mild increased inflammation in her small bowel  This sometimes can be nonspecific but can be related to an autoimmune disorder called celiac disease  I have ordered blood work for further evaluation  The stomach biopsy showed mild inflammation and no infection    Polyp removed showed only fecal matter and no evidence of polyp  The polyp was removed at the time of colonoscopy  Based on the appearance of the polyp I recommend she repeat colonoscopy in 7 years      She is scheduled for office follow-up in February

## 2023-01-23 NOTE — PROGRESS NOTES
Name: Jeoffrey Gottron      : 1980      MRN: [id number]  Encounter Provider: Aaron Arrington MD  Encounter Date: 2023   Encounter department: [facility]  Mirza Mahan       1  Other iron deficiency anemia  Assessment & Plan:  CBC from visit on 2022 showed hemoglobin of 7 3 and MCV of 66  Iron panel from same visit showed: iron saturation of 4, TIBC 416, iron 18, ferritin levels extremely low at 6  TVUS 2022: single uterine fibroid, tiny vague echogenic areas within the endometrium, which may be within normal limits  However, if patient is experiencing irregular uterine bleeding, endometrial polyps are possible  Ovaries are within normal limits  Endometrial Biopsy (2022): fragments of secretory endometrium with reactive lymphoid aggregates and scattered plasma cells (MUM-1)  Negative for endometrial intraepithelial neoplasia (EIN), malignancy or lymphoproliferative disorder  Menstrual Cycles: now much more regular, occur monthly, and last for about 7 days with 4 days of heavier bleeding  She saturates about 4-5 super pads daily on her heaviest days  She was previously having irregular daily bleeding back in March, but that has resolved  She has previously been on OCPs years ago that regulated her periods, however she stopped taking them due to reported gastric discomfort  Patient denies shortness of breath  No tachycardia  States her fatigue and headaches have improved significantly since starting iron infusions  Patient has completed a set of Venofer infusions in 2022  EGD/colonoscopy performed in 2023 (earlier this month) showed a small hiatal hernia, one sessile polyp (removed), and small internal hemorrhoids  Will require repeat in 7 years due to polyps  At this time, anemia likely secondary to heavy menstrual bleeding due to overall negative EGD/colonoscopy       - Continue Protonix 40 mg daily   - Ordered repeat CBC and iron panel to be completed at the end of this month to recheck levels  - Continue following with GI; appreciate recommendations  2  Encounter for screening mammogram for breast cancer  -     Mammo screening bilateral w 3d & cad; Future; Expected date: 01/23/2023    3  Other constipation  Assessment & Plan:  Patient states she is having regular bowel movements since starting Miralax  - Continue Miralax as needed to achieve regular bowel movements  - Discussed dietary modifications  4  Epigastric pain  -     pantoprazole (PROTONIX) 40 mg tablet; Take 1 tablet (40 mg total) by mouth daily       BMI Counseling: There is no height or weight on file to calculate BMI  The BMI is above normal  Nutrition recommendations include decreasing portion sizes, encouraging healthy choices of fruits and vegetables, limiting drinks that contain sugar, moderation in carbohydrate intake, reducing intake of saturated and trans fat and reducing intake of cholesterol  Exercise recommendations include moderate physical activity 150 minutes/week and exercising 3-5 times per week  No pharmacotherapy was ordered  Patient referred to PCP  Rationale for BMI follow-up plan is due to patient being overweight or obese  Health Maintenance:    - Patient has not yet gotten COVID vaccine and advised to do so  - Pap smear from 12/2022 negative for malignancy as well as HPV  Subjective      This is a very pleasant 46 y o  female with PMH of iron deficiency anemia who presents to the clinic for management of their chronic medical conditions  Patient's medical conditions are stable unless noted otherwise above  Patient has not had any recent hospitalizations or medical emergencies since last visit  Patient has no further complaints other than what is mentioned in the review of systems   Sadie Taylor [id number] used for this visit       Review of Systems   Constitutional: Negative for activity change, appetite change, chills, fatigue and fever  HENT: Negative for sore throat  Respiratory: Negative for cough and shortness of breath  Cardiovascular: Negative for chest pain, palpitations and leg swelling  Gastrointestinal: Negative for abdominal pain, constipation, diarrhea, nausea and vomiting  Musculoskeletal: Negative for back pain and gait problem  Neurological: Negative for dizziness, seizures, weakness and headaches  Current Outpatient Medications on File Prior to Visit   Medication Sig   • diphenhydrAMINE (BENADRYL) 25 mg tablet Take 1 tablet (25 mg total) by mouth every 6 (six) hours as needed for itching   • naproxen (NAPROSYN) 375 mg tablet Take 1 tablet (375 mg total) by mouth 2 (two) times a day with meals   • [DISCONTINUED] pantoprazole (PROTONIX) 40 mg tablet TAKE 1 TABLET BY MOUTH EVERY DAY       Objective     /70 (BP Location: Left arm, Patient Position: Sitting, Cuff Size: Standard)   Pulse 102   Temp 97 8 °F (36 6 °C) (Temporal)   Resp (!) 8   Ht 5' 3" (1 6 m)   Wt 89 8 kg (197 lb 14 4 oz)   LMP 01/20/2023 (Exact Date)   SpO2 97%   BMI 35 06 kg/m²     Physical Exam  Vitals reviewed  Constitutional:       General: She is not in acute distress  Appearance: She is well-developed  She is not ill-appearing or diaphoretic  HENT:      Head: Normocephalic and atraumatic  Nose: No congestion or rhinorrhea  Mouth/Throat:      Mouth: Mucous membranes are moist       Pharynx: Oropharynx is clear  No oropharyngeal exudate or posterior oropharyngeal erythema  Eyes:      General: No scleral icterus  Right eye: No discharge  Left eye: No discharge  Conjunctiva/sclera: Conjunctivae normal       Comments: No pallor noted   Cardiovascular:      Rate and Rhythm: Normal rate and regular rhythm  Heart sounds: Normal heart sounds  No murmur heard  No friction rub  No gallop     Pulmonary:      Effort: Pulmonary effort is normal  No respiratory distress  Breath sounds: Normal breath sounds  No wheezing or rales  Abdominal:      General: Bowel sounds are normal  There is no distension  Palpations: Abdomen is soft  There is no mass  Tenderness: There is no abdominal tenderness  There is no guarding  Musculoskeletal:         General: No tenderness or deformity  Normal range of motion  Cervical back: Normal range of motion  Right lower leg: No edema  Left lower leg: No edema  Skin:     General: Skin is warm and dry  Neurological:      General: No focal deficit present  Mental Status: She is alert and oriented to person, place, and time            Damir Scott MD   1/23/2023  used

## 2023-01-23 NOTE — ASSESSMENT & PLAN NOTE
Patient states she is having regular bowel movements since starting Miralax  - Continue Miralax as needed to achieve regular bowel movements  - Discussed dietary modifications

## 2023-01-23 NOTE — ASSESSMENT & PLAN NOTE
CBC from visit on 9/29/2022 showed hemoglobin of 7 3 and MCV of 66  Iron panel from same visit showed: iron saturation of 4, TIBC 416, iron 18, ferritin levels extremely low at 6  TVUS 8/2022: single uterine fibroid, tiny vague echogenic areas within the endometrium, which may be within normal limits  However, if patient is experiencing irregular uterine bleeding, endometrial polyps are possible  Ovaries are within normal limits  Endometrial Biopsy (8/2022): fragments of secretory endometrium with reactive lymphoid aggregates and scattered plasma cells (MUM-1)  Negative for endometrial intraepithelial neoplasia (EIN), malignancy or lymphoproliferative disorder  Menstrual Cycles: now much more regular, occur monthly, and last for about 7 days with 4 days of heavier bleeding  She saturates about 4-5 super pads daily on her heaviest days  She was previously having irregular daily bleeding back in March, but that has resolved  She has previously been on OCPs years ago that regulated her periods, however she stopped taking them due to reported gastric discomfort  Patient denies shortness of breath  No tachycardia  States her fatigue and headaches have improved significantly since starting iron infusions  Patient has completed a set of Venofer infusions in November 2022  EGD/colonoscopy performed in 1/2023 (earlier this month) showed a small hiatal hernia, one sessile polyp (removed), and small internal hemorrhoids  Will require repeat in 7 years due to polyps  At this time, anemia likely secondary to heavy menstrual bleeding due to overall negative EGD/colonoscopy  - Continue Protonix 40 mg daily   - Ordered repeat CBC and iron panel to be completed at the end of this month to recheck levels  - Continue following with GI; appreciate recommendations

## 2023-01-23 NOTE — PROGRESS NOTES
Daily Note     Today's date: 2023  Patient name: Jeoffrey Gottron  : 1980  MRN: [id number]  Referring provider: Parker Hernandez MD  Dx:   Encounter Diagnosis     ICD-10-CM    1  Acute pain of right shoulder  M25 511       2  Tendinitis of right rotator cuff  M75 81           Start Time: 0930  Stop Time: 1018  Total time in clinic (min): 48 minutes    Subjective: pt reports that she has improvements with her motion and that she doesn't have arm pain unless she lift his arm and holds it for an extended period of time  Objective: See treatment diary below      Assessment: Tolerated treatment well  Patient demonstrated fatigue post treatment, exhibited good technique with therapeutic exercises and would benefit from continued PT  Pt tolerated today's session well as per appropriate response to intervention  She demonstrated improved R shoulder irritability, AROM WNL since IE; progressed intervention to improve muscular endurance  Educated pt on rotator cuff referral pattern and provided pt with updated HEP  She continues to demonstrate poor muscle performance affecting her ability to reach overhead, and would benefit from continued, skilled PT to improve impairments to improve functional mobility  1:1 with Lizeth Guzman DPT for 38 minutes of tx  Plan: Continue per plan of care        Precautions: obesity    Pertinent Findings:                                                                                                                                                     Test / Measure  23   FOTO (Pred 69) 49   Shoulder Elevation AROM 90 deg*   R shoulder elevation MMT 2+/5   Labral Special Tests P         Manuals           PROM  KY BV          Rhythmic Stabilization   BV                                    Neuro Re-Ed           TBand ER/IR   Green/Blue  2x15 ea                                                                                        Ther Ex 1/17 1/18 1/23          Pulleys  5' 5'          Pendulums  x10 ea D/C          Table Slides Flex/Scaption  5" x10 ea D/C          Wall Slides with towel   20x5" flex/  15x5"  ABD          RTC Isometrics   ER/Ext 10"x10          Rows  Red x15 TBandRows/Ext  Blue 2x15          Scap Retrac    5"x10                                     Ther Activity 1/17 1/18 1/23                                    Gait Training 1/17 1/18 1/23                                    Modalities 1/17 1/18 1/23

## 2023-01-25 ENCOUNTER — OFFICE VISIT (OUTPATIENT)
Dept: PHYSICAL THERAPY | Facility: CLINIC | Age: 43
End: 2023-01-25

## 2023-01-25 DIAGNOSIS — M25.511 ACUTE PAIN OF RIGHT SHOULDER: Primary | ICD-10-CM

## 2023-01-25 DIAGNOSIS — M75.81 TENDINITIS OF RIGHT ROTATOR CUFF: ICD-10-CM

## 2023-01-25 NOTE — PROGRESS NOTES
Daily Note     Today's date: 2023  Patient name: Marnie Fila MurilloReyes  : 1980  MRN: [id number]  Referring provider: Maria Luisa Ramirez MD  Dx:   Encounter Diagnosis     ICD-10-CM    1  Acute pain of right shoulder  M25 511       2  Tendinitis of right rotator cuff  M75 81           Start Time: 09  Stop Time: 1007  Total time in clinic (min): 41 minutes    Subjective: pt reports that her shoulder movement isn't painful but that she has some lateral shoulder pain at night and while sleeping      Objective: See treatment diary below  AROM shoulder elevation: WNL    Assessment: Tolerated treatment well  Patient demonstrated fatigue post treatment, exhibited good technique with therapeutic exercises and would benefit from continued PT  Pt tolerated today's session well as per appropriate response to intervention  She demonstrated improve AROM and irritability since beginning PT intervention  Increased volume for rotator cuff strengthening and pt demonstrate poor muscle performance  Educated pt on rotator cuff referral pattern and encouraged pt to try sleeping in a recliner if possible to improve sleep  She would benefit from continued skilled PT to improve impairments to improve pain and sleep quality  1:1 with Fina Perez DPT for entirety of tx  Plan: Continue per plan of care        Precautions: obesity    Pertinent Findings:                                                                                                                                                     Test / Measure  23   FOTO (Pred 69) 49   Shoulder Elevation AROM 90 deg*   R shoulder elevation MMT 2+/5   Labral Special Tests P         Manuals          PROM  KY BV          Rhythmic Stabilization   BV                                    Neuro Re-Ed          TBand ER/IR   Green/Blue  2x15 ea K 3#/5# 2x15 ea         Ball Wall Circles    2x15 ea         Sidelying ER             Shoulder Elevation + TBand Horz ADB Iso                                                    Pt education    BV         Ther Ex 1/17 1/18 1/23 1/25         Pulleys  5' 5'          UBE    3'/3'         Pendulums  x10 ea D/C          Table Slides Flex/Scaption  5" x10 ea D/C          Wall Slides with towel   20x5" flex/  15x5"  ABD          RTC Isometrics   ER/Ext 10"x10          Rows  Red x15 TBandRows/Ext  Blue 2x15 [place] Rows/Ext 2x15 #15         Scap Retrac    5"x10           Standing ITYs    2# x15 ea         IR str c Str    30"x3                                                Ther Activity 1/17 1/18 1/23 1/25                                   Gait Training 1/17 1/18 1/23 1/25                                   Modalities 1/17 1/18 1/23

## 2023-01-26 ENCOUNTER — OFFICE VISIT (OUTPATIENT)
Dept: OBGYN CLINIC | Facility: MEDICAL CENTER | Age: 43
End: 2023-01-26

## 2023-01-26 VITALS
WEIGHT: 198 LBS | SYSTOLIC BLOOD PRESSURE: 120 MMHG | BODY MASS INDEX: 35.08 KG/M2 | HEIGHT: 63 IN | HEART RATE: 88 BPM | DIASTOLIC BLOOD PRESSURE: 68 MMHG

## 2023-01-26 DIAGNOSIS — M25.511 ACUTE PAIN OF RIGHT SHOULDER: ICD-10-CM

## 2023-01-26 DIAGNOSIS — M75.81 TENDINITIS OF RIGHT ROTATOR CUFF: Primary | ICD-10-CM

## 2023-01-26 DIAGNOSIS — M11.211 CHONDROCALCINOSIS OF RIGHT SHOULDER REGION: ICD-10-CM

## 2023-01-26 NOTE — PROGRESS NOTES
Assessment/Plan:    Diagnoses and all orders for this visit:    Tendinitis of right rotator cuff    Acute pain of right shoulder    Chondrocalcinosis of right shoulder region    Patient improving  No signs FT RTC tear  Continue PT  T/C MRI Right shoulder next visit   Informed to stop using sling    Work forms were previously completed by [employer] team and faxed  Out of work note provided     Return in about 3 weeks (around 2/16/2023)  Chief Complaint:     Chief Complaint   Patient presents with   • Right Shoulder - Follow-up       Subjective:   Patient ID: Brady Riley is a 38 y o  female  Patient returns s/p right subacromial CSI and PT x 4 sessions but with pain of the top and posterior shoulder (on the bone) worse with external rotation  Pain will radiate down UE  Pain 5 to 3/10 after CSI  Taking Tylenol and Advil  She is out of work  Initial note:  Patient presents for approximately 1-2 weeks of right lateral shoulder pain 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  She was evaluated in the emergency department x-rays of the right shoulder were obtained and reviewed today, Prescribed prednisone tramadol and naproxen, but these medications upset stomach thus only takes Tylenol      Review of Systems    The following portions of the patient's chart were reviewed and updated as appropriate:      Allergie  Allergies   Allergen Reactions   • Iron [Ferrous Sulfate] Abdominal Pain   s   Allergen Reactions   • Iron [Ferrous Sulfate] Abdominal Pain      Diagnosis Date   • Anemia    • Yeast infection 8/25/2022       Past Surgical History:   Procedure Laterality Date   • TUBAL LIGATION         Social History     Socioeconomic History   • Marital status: Single     Spouse name: Not on file   • Number of children: Not on file   • Years of education: Not on file   • Highest education level: Not on file   Occupational History • Not on file   Tobacco Use   • Smoking status: Never   • Smokeless tobacco: Never   Vaping Use   • Vaping Use: Never used   Substance and Sexual Activity   • Alcohol use: Not Currently   • Drug use: Never   • Sexual activity: Not Currently   Other Topics Concern   • Not on file   Social History Narrative   • Not on file     Social Determinants of Health     Financial Resource Strain: Low Risk    • Difficulty of Paying Living Expenses: Not hard at all   Food Insecurity: No Food Insecurity   • Worried About [employer] in the Last Year: Never true   • Ran Out of Food in the Last Year: Never true   Transportation Needs: No Transportation Needs   • Lack of Transportation (Medical): No   • Lack of Transportation (Non-Medical):  No   Physical Activity: Not on file   Stress: Not on file   Social Connections: Not on file   Intimate Partner Violence: Not on file   Housing Stability: Low Risk    • Unable to Pay for Housing in the Last Year: No   • Number of Places Lived in the Last Year: 1   • Unstable Housing in the Last Year: No       Family History   Problem Relation Age of Onset   • Diabetes Mother    • Diabetes Father    • Colon cancer Neg Hx    • Colon polyps Neg Hx    • Stomach cancer Neg Hx        Medications:    Current Outpatient Medications:   •  diphenhydrAMINE (BENADRYL) 25 mg tablet, Take 1 tablet (25 mg total) by mouth every 6 (six) hours as needed for itching, Disp: 30 tablet, Rfl: 0  •  naproxen (NAPROSYN) 375 mg tablet, Take 1 tablet (375 mg total) by mouth 2 (two) times a day with meals, Disp: 20 tablet, Rfl: 0  •  pantoprazole (PROTONIX) 40 mg tablet, Take 1 tablet (40 mg total) by mouth daily, Disp: 30 tablet, Rfl: 6    Patient Active Problem List   Diagnosis   • Class 1 obesity due to excess calories with body mass index (BMI) of 34 0 to 34 9 in adult   • Abnormal uterine bleeding (AUB)   • Iron deficiency anemia   • Epigastric pain   • Other constipation       Objective:  /68   Pulse 88 Ht 5' 3" (1 6 m)   Wt 89 8 kg (198 lb)   LMP 01/20/2023 (Exact Date)   BMI 35 07 kg/m²     Right Shoulder Exam     Range of Motion   Internal rotation 0 degrees: abnormal     Muscle Strength   Right shoulder normal muscle strength: pain with full can test   External rotation: 5/5   Supraspinatus: 5/5     Tests   Drop arm: negative    Other   Erythema: absent    Comments:  Patient is tender over the scapular spine      Left Shoulder Exam     Muscle Strength   External rotation: 5/5             Physical Exam      Neurologic Exam    Procedures    I have personally reviewed the written report of the pertinent studies

## 2023-01-26 NOTE — LETTER
January 26, 2023     Patient: Tommas Schneiders MurilloReyes  YOB: 1980  Date of Visit: 1/26/2023      To Whom it May Concern:    Alyce Knapp is under my professional care  Aurelio Parrish was seen in my office on 1/26/2023  Work excuse until next appointment in 3 weeks  Continue PT    If you have any questions or concerns, please don't hesitate to call           Sincerely,          Shyanne Hutchison MD        CC: No Recipients

## 2023-01-30 ENCOUNTER — LAB (OUTPATIENT)
Dept: LAB | Facility: CLINIC | Age: 43
End: 2023-01-30

## 2023-01-30 ENCOUNTER — OFFICE VISIT (OUTPATIENT)
Dept: PHYSICAL THERAPY | Facility: CLINIC | Age: 43
End: 2023-01-30

## 2023-01-30 DIAGNOSIS — D50.8 OTHER IRON DEFICIENCY ANEMIA: ICD-10-CM

## 2023-01-30 DIAGNOSIS — M75.81 TENDINITIS OF RIGHT ROTATOR CUFF: ICD-10-CM

## 2023-01-30 DIAGNOSIS — M25.511 ACUTE PAIN OF RIGHT SHOULDER: Primary | ICD-10-CM

## 2023-01-30 DIAGNOSIS — D50.9 IRON DEFICIENCY ANEMIA, UNSPECIFIED IRON DEFICIENCY ANEMIA TYPE: ICD-10-CM

## 2023-01-30 DIAGNOSIS — Z11.3 SCREEN FOR STD (SEXUALLY TRANSMITTED DISEASE): ICD-10-CM

## 2023-01-30 LAB
BASOPHILS # BLD AUTO: 0.04 THOUSANDS/ÂΜL (ref 0–0.1)
BASOPHILS NFR BLD AUTO: 0 % (ref 0–1)
EOSINOPHIL # BLD AUTO: 0.08 THOUSAND/ÂΜL (ref 0–0.61)
EOSINOPHIL NFR BLD AUTO: 1 % (ref 0–6)
ERYTHROCYTE [DISTWIDTH] IN BLOOD BY AUTOMATED COUNT: 19 % (ref 11.6–15.1)
FERRITIN SERPL-MCNC: 15 NG/ML (ref 8–388)
HCT VFR BLD AUTO: 34.4 % (ref 34.8–46.1)
HGB BLD-MCNC: 10.5 G/DL (ref 11.5–15.4)
IGA SERPL-MCNC: 233 MG/DL (ref 70–400)
IMM GRANULOCYTES # BLD AUTO: 0.02 THOUSAND/UL (ref 0–0.2)
IMM GRANULOCYTES NFR BLD AUTO: 0 % (ref 0–2)
IRON SATN MFR SERPL: 7 % (ref 15–50)
IRON SERPL-MCNC: 22 UG/DL (ref 50–170)
LYMPHOCYTES # BLD AUTO: 2.83 THOUSANDS/ÂΜL (ref 0.6–4.47)
LYMPHOCYTES NFR BLD AUTO: 31 % (ref 14–44)
MCH RBC QN AUTO: 24.4 PG (ref 26.8–34.3)
MCHC RBC AUTO-ENTMCNC: 30.5 G/DL (ref 31.4–37.4)
MCV RBC AUTO: 80 FL (ref 82–98)
MONOCYTES # BLD AUTO: 0.67 THOUSAND/ÂΜL (ref 0.17–1.22)
MONOCYTES NFR BLD AUTO: 7 % (ref 4–12)
NEUTROPHILS # BLD AUTO: 5.45 THOUSANDS/ÂΜL (ref 1.85–7.62)
NEUTS SEG NFR BLD AUTO: 61 % (ref 43–75)
NRBC BLD AUTO-RTO: 0 /100 WBCS
PLATELET # BLD AUTO: 275 THOUSANDS/UL (ref 149–390)
PMV BLD AUTO: 10.9 FL (ref 8.9–12.7)
RBC # BLD AUTO: 4.31 MILLION/UL (ref 3.81–5.12)
TIBC SERPL-MCNC: 326 UG/DL (ref 250–450)
WBC # BLD AUTO: 9.09 THOUSAND/UL (ref 4.31–10.16)

## 2023-01-30 NOTE — PROGRESS NOTES
Daily Note     Today's date: 2023  Patient name: Sandra Escamilla  : 1980  MRN: [id number]  Referring provider: Francie Morales MD  Dx:   Encounter Diagnosis     ICD-10-CM    1  Acute pain of right shoulder  M25 511       2  Tendinitis of right rotator cuff  M75 81           Start Time: 0925  Stop Time: 1004  Total time in clinic (min): 39 minutes    Subjective: Pt rates pain in right shoulder as a 5/10 along upper trap region at the start of her session  Objective: See treatment diary below      Assessment:  Sonya tolerated treatment well with consistent cuing throughout  She exhibits good form with exercises after given visual cues and demonstration  She reports no elevation in sx during treatment session  Plan: Continue per plan of care        Precautions: obesity    Pertinent Findings:                                                                                                                                                     Test / Measure  23   FOTO (Pred 69) 49   Shoulder Elevation AROM 90 deg*   R shoulder elevation MMT 2+/5   Labral Special Tests P         Manuals         PROM  KY BV          Rhythmic Stabilization   BV                                    Neuro Re-Ed         TBand ER/IR   Green/Blue  2x15 ea K 3#/5# 2x15 ea K 3#/5# 2x15 ea        Ball Wall Circles    2x15 ea 2x15 ea        Sidelying ER             Shoulder Elevation + TBand Horz ADB Iso                                                    Pt education    BV FH        Ther Ex         Pulleys  5' 5'          UBE    3'/3' 3'/3'        Pendulums  x10 ea D/C          Table Slides Flex/Scaption  5" x10 ea D/C          Wall Slides with towel   20x5" flex/  15x5"  ABD  20x5" flex/  15x5"  ABD        RTC Isometrics   ER/Ext 10"x10          Rows  Red x15 TBandRows/Ext  Blue 2x15 [place]el[place] Rows/Ext 2x15 #15 Yelitza Rows/Ext 2x15 #15        Scap Retrac    5"x10           Standing ITYs    2# x15 ea 2# x15 ea        IR str c Str    30"x3 30''x3        UT st      30''x3                                  Ther Activity 1/17 1/18 1/23 1/25                                   Gait Training 1/17 1/18 1/23 1/25                                   Modalities 1/17 1/18 1/23

## 2023-01-31 LAB
RPR SER QL: NORMAL
TTG IGA SER-ACNC: <2 U/ML (ref 0–3)

## 2023-02-01 ENCOUNTER — OFFICE VISIT (OUTPATIENT)
Dept: PHYSICAL THERAPY | Facility: CLINIC | Age: 43
End: 2023-02-01

## 2023-02-01 DIAGNOSIS — M75.81 TENDINITIS OF RIGHT ROTATOR CUFF: ICD-10-CM

## 2023-02-01 DIAGNOSIS — D50.8 OTHER IRON DEFICIENCY ANEMIA: Primary | ICD-10-CM

## 2023-02-01 DIAGNOSIS — M25.511 ACUTE PAIN OF RIGHT SHOULDER: Primary | ICD-10-CM

## 2023-02-01 RX ORDER — SODIUM CHLORIDE 9 MG/ML
20 INJECTION, SOLUTION INTRAVENOUS ONCE
OUTPATIENT
Start: 2023-02-13

## 2023-02-01 NOTE — RESULT ENCOUNTER NOTE
Iron levels continue to be low  I would follow up with hematology regarding further infusions  We can discuss further during appt in 2 weeks    Arvin Anderson

## 2023-02-01 NOTE — PROGRESS NOTES
Daily Note     Today's date: 2023  Patient name: Auston Rima MurilloReyes  : 1980  MRN: [id number]  Referring provider: Chico Jarrett MD  Dx:   Encounter Diagnosis     ICD-10-CM    1  Acute pain of right shoulder  M25 511       2  Tendinitis of right rotator cuff  M75 81                      Subjective: pt reports that her shoulder is improving with PT but that she continues to have lateral arm pain as well as "clicking" with different activities      Objective: See treatment diary below      Assessment: Tolerated treatment well  Patient demonstrated fatigue post treatment, exhibited good technique with therapeutic exercises and would benefit from continued PT  Pt tolerated today's session well as per appropriate response to intervention  Educated on shoulder anatomy any the importance of improving dynamic stabilizer muscle performance to improve stability at the shoulder  Pt demonstrated increased pain and poor muscle performance with activities, encouraged to take sufficient rest breaks and to continue with exercises if tolerable  She continues to report shoulder clicking and R shoulder/brachial pain, and would benefit from continued, skilled PT to improve impairments to return to work  1:1 with Renee Medina DPT for entirety of tx  Plan: Continue per plan of care        Precautions: obesity    Pertinent Findings:                                                                                                                                                     Test / Measure  23   FOTO (Pred 69) 49   Shoulder Elevation AROM 90 deg*   R shoulder elevation MMT 2+/5   Labral Special Tests P         Manuals        PROM  KY BV          Rhythmic Stabilization   BV                                    Neuro Re-Ed        TBand ER/IR   Green/Blue  2x15 ea K 3#/5# 2x15 ea K 3#/5# 2x15 ea K 3#/5# x15 ea       Ball Wall Circles    2x15 ea 2x15 ea 2x15 ea       Sidelying ER             Shoulder Elevation + TBand Horz ADB Iso                                                    Pt education    BV FH BV       Ther Ex 1/17 1/18 1/23 1/25 1/30 2/1       Pulleys  5' 5'          UBE    3'/3' 3'/3' D/C       Pendulums  x10 ea D/C          Table Slides Flex/Scaption  5" x10 ea D/C          Wall Slides with towel   20x5" flex/  15x5"  ABD  20x5" flex/  15x5"  ABD 20x5" flex/  15x5"  ABD       RTC Isometrics   ER/Ext 10"x10          Rows  Red x15 TBandRows/Ext  Blue 2x15 Yelit[place] Rows/Ext 2x15 #15 [place] Rows/Ext 2x15 #15 [place] Rows/Ext 2x15 #15       Scap Retrac    5"x10           Standing ITYs    2# x15 ea 2# x15 ea  NV      IR str c Str    30"x3 30''x3        UT st      30''x3        Active Shoulder elevation + TBand Horz ABD ISO      OTB x8 patrial range                    Ther Activity 1/17 1/18 1/23 1/25  2/1                                 Gait Training 1/17 1/18 1/23 1/25 2/1                                 Modalities 1/17 1/18 1/23 2/1

## 2023-02-01 NOTE — RESULT ENCOUNTER NOTE
Please call patient and inform her that her anemia has improved from before  However, her iron stores are still very low and she will require another set of Venofer infusions  I have ordered 6 infusions to the same infusion center she was previously going to [facility]), once a week to start on 2/13/2023 (Monday)  Can you please let her know to call the infusion center ([phone]) to schedule these? Patient is [place] speaking  Thank you!

## 2023-02-06 ENCOUNTER — OFFICE VISIT (OUTPATIENT)
Dept: PHYSICAL THERAPY | Facility: CLINIC | Age: 43
End: 2023-02-06

## 2023-02-06 DIAGNOSIS — M25.511 ACUTE PAIN OF RIGHT SHOULDER: Primary | ICD-10-CM

## 2023-02-06 DIAGNOSIS — M75.81 TENDINITIS OF RIGHT ROTATOR CUFF: ICD-10-CM

## 2023-02-06 NOTE — PROGRESS NOTES
Daily Note     Today's date: 2023  Patient name: Clarice Slater  : 1980  MRN: [id number]  Referring provider: Michael Gracia MD  Dx:   Encounter Diagnosis     ICD-10-CM    1  Acute pain of right shoulder  M25 511       2  Tendinitis of right rotator cuff  M75 81           Start Time: 931  Stop Time: 1008  Total time in clinic (min): 37 minutes    Subjective: pt notes that she continued to have shoulder pain and that she had significant shoulder pain this weekend at [employer] putting her purse around her R arm and bringing her arm around her back  Objective: See treatment diary below  Sulcus sign: (+) R    Assessment: Tolerated treatment well  Patient demonstrated fatigue post treatment, exhibited good technique with therapeutic exercises and would benefit from continued PT  Pt tolerated today's session well as per appropriate response to intervention  She demonstrated increased irritability and decreased muscle performance entering today's session, decreased volume of exercise intervention  She continues to demonstrate signs/symptoms of R shoulder instability; educated pt on shoulder anatomy and encouraged pt to monitor symptoms and to limit activities where she reports "clicking " Educated pt on referred shoulder pain from rotator cuff and MRI imaging  She would benefit from continued, skilled PT to improve impairments to improve QoL  1:1 with Mary Vang DPT for entirety of tx  Plan: Continue per plan of care        Precautions: obesity    Pertinent Findings:                                                                                                                                                     Test / Measure  23   FOTO (Pred 69) 42 47   Shoulder Elevation AROM 90 deg*    R shoulder elevation MMT 2+/5    Labral Special Tests P          Manuals  2/      PROM  KY BV          Rhythmic Stabilization   BV    BV Neuro Re-Ed 1/17 1/18 1/23 1/25 1/30 2/1 2/6      TBand ER/IR   Green/Blue  2x15 ea K 3#/5# 2x15 ea K 3#/5# 2x15 ea K 3#/5# x15 ea  NV     Ball Wall Circles    2x15 ea 2x15 ea 2x15 ea 2x15 ea      Sidelying ER             Shoulder Elevation + TBand Horz ADB Iso                                                    Pt education    BV FH BV BV      Ther Ex 1/17 1/18 1/23 1/25 1/30 2/1 2/6      Pulleys  5' 5'          UBE    3'/3' 3'/3' D/C       Pendulums  x10 ea D/C          Table Slides Flex/Scaption  5" x10 ea D/C          Wall Slides with towel   20x5" flex/  15x5"  ABD  20x5" flex/  15x5"  ABD 20x5" flex/  15x5"  ABD 15x5" flex      RTC Isometrics   ER/Ext 10"x10          Rows  Red x15 TBandRows/Ext  Blue 2x15 [place] Rows/Ext 2x15 #15 [place] Rows/Ext 2x15 #15 [place] Rows/Ext 2x15 #15 [place] Rows/Ext 2x15 #15      Scap Retrac    5"x10           Standing ITYs    2# x15 ea 2# x15 ea        IR str c Str    30"x3 30''x3        UT st      30''x3        Active Shoulder elevation + TBand Horz ABD ISO      OTB x8 patrial range                    Ther Activity 1/17 1/18 1/23 1/25  2/1 2/6                                Gait Training 1/17 1/18 1/23 1/25  2/1 2/6                                Modalities 1/17 1/18 1/23   2/1 2/6

## 2023-02-08 ENCOUNTER — OFFICE VISIT (OUTPATIENT)
Dept: PHYSICAL THERAPY | Facility: CLINIC | Age: 43
End: 2023-02-08

## 2023-02-08 DIAGNOSIS — M75.81 TENDINITIS OF RIGHT ROTATOR CUFF: ICD-10-CM

## 2023-02-08 DIAGNOSIS — M25.511 ACUTE PAIN OF RIGHT SHOULDER: Primary | ICD-10-CM

## 2023-02-08 NOTE — PROGRESS NOTES
Daily Note     Today's date: 2023  Patient name: Alain Ferri MurilloReyes  : 1980  MRN: [id number]  Referring provider: Nathan Zamudio MD  Dx:   Encounter Diagnosis     ICD-10-CM    1  Acute pain of right shoulder  M25 511       2  Tendinitis of right rotator cuff  M75 81           Start Time: 0936  Stop Time: 1010  Total time in clinic (min): 34 minutes    Subjective: pt reports that she has 7-8/10 pain entering today and denies any increase in activity  She notes that she has it more when she's carrying her purse in her affected arm  Objective: See treatment diary below      Assessment: Tolerated treatment well  Patient demonstrated fatigue post treatment, exhibited good technique with therapeutic exercises and would benefit from continued PT  Pt tolerated today's session well as per appropriate response to intervention  She demonstrated increased irritability and requiring increased rest break throughout today's treatment, but was able to perform exercises without increase in symptoms  Educated to use moist heat and perform pendulums for pain modulation in conjunction with HEP  Pt would benefit from continued, skilled PT to improve impairments to improve QoL  1:1 with Kwame Cortez DPT for entirety of tx  Plan: Continue per plan of care        Precautions: obesity    Pertinent Findings:                                                                                                                                                     Test / Measure  23   FOTO (Pred 69) 42 47   Shoulder Elevation AROM 90 deg*    R shoulder elevation MMT 2+/5    Labral Special Tests P          Manuals  2/ 2/6 2/8     PROM  KY BV          Rhythmic Stabilization   BV    BV                                Neuro Re-Ed  2/ 2/6 2/8     TBand ER/IR   Green/Blue  2x15 ea K 3#/5# 2x15 ea K 3#/5# 2x15 ea K 3#/5# x15 ea  K 2#/5# x15 ea     Ball Wall Circles 2x15 ea 2x15 ea 2x15 ea 2x15 ea 2x15 ea     Sidelying ER             Shoulder Elevation + TBand Horz ADB Iso                                                    Pt education    BV FH BV BV      Ther Ex 1/17 1/18 1/23 1/25 1/30 2/1 2/6 2/8     Pulleys  5' 5'          UBE    3'/3' 3'/3' D/C       Pendulums  x10 ea D/C          Table Slides Flex/Scaption  5" x10 ea D/C          Wall Slides with towel   20x5" flex/  15x5"  ABD  20x5" flex/  15x5"  ABD 20x5" flex/  15x5"  ABD 15x5" flex 15x5" flex     RTC Isometrics   ER/Ext 10"x10          Rows  Red x15 TBandRows/Ext  Blue 2x15 [place] Rows/Ext 2x15 #15 [place] Rows/Ext 2x15 #15 [place] Rows/Ext 2x15 #15 [place] Rows/Ext 2x15 #15 [place] Rows/Ext 2x15 #15/12#     Scap Retrac    5"x10           Standing ITYs    2# x15 ea 2# x15 ea   2# x15 ea     IR str c Str    30"x3 30''x3        UT st      30''x3        Active Shoulder elevation + TBand Horz ABD ISO      OTB x8 patrial range                    Ther Activity 1/17 1/18 1/23 1/25  2/1 2/6 2/8                               Gait Training 1/17 1/18 1/23 1/25  2/1 2/6 2/8                               Modalities 1/17 1/18 1/23   2/1 2/6 2/8

## 2023-02-13 ENCOUNTER — EVALUATION (OUTPATIENT)
Dept: PHYSICAL THERAPY | Facility: CLINIC | Age: 43
End: 2023-02-13

## 2023-02-13 ENCOUNTER — OFFICE VISIT (OUTPATIENT)
Dept: GASTROENTEROLOGY | Facility: MEDICAL CENTER | Age: 43
End: 2023-02-13

## 2023-02-13 VITALS
WEIGHT: 199.8 LBS | HEIGHT: 63 IN | BODY MASS INDEX: 35.4 KG/M2 | SYSTOLIC BLOOD PRESSURE: 108 MMHG | TEMPERATURE: 98.2 F | OXYGEN SATURATION: 97 % | HEART RATE: 79 BPM | DIASTOLIC BLOOD PRESSURE: 75 MMHG

## 2023-02-13 DIAGNOSIS — R10.13 EPIGASTRIC PAIN: ICD-10-CM

## 2023-02-13 DIAGNOSIS — M25.511 ACUTE PAIN OF RIGHT SHOULDER: Primary | ICD-10-CM

## 2023-02-13 DIAGNOSIS — M75.81 TENDINITIS OF RIGHT ROTATOR CUFF: ICD-10-CM

## 2023-02-13 DIAGNOSIS — D50.9 IRON DEFICIENCY ANEMIA, UNSPECIFIED IRON DEFICIENCY ANEMIA TYPE: Primary | ICD-10-CM

## 2023-02-13 DIAGNOSIS — K59.09 OTHER CONSTIPATION: ICD-10-CM

## 2023-02-13 NOTE — PROGRESS NOTES
Re-Evaluation Note     Today's date: 2023  Patient name: Zack Barker  : 1980  MRN: [id number]  Referring provider: Nathan Zamudio MD  Dx:   Encounter Diagnosis     ICD-10-CM    1  Acute pain of right shoulder  M25 511       2  Tendinitis of right rotator cuff  M75 81           Start Time: 1130  Stop Time: 1205  Total time in clinic (min): 35 minutes    Subjective: Pt reports that her pain lifting her arm is much improved since beginning PT intervention where she has not able to don/doff a bra as well as apply deoderant  She notes that she is unable to lift her arm overhead for a long time because it is weak, as well as reach her hand behind her back  She states that even though her pain has improved, that she has a "different pain" which has some clicking  She notes that this point occurs when she is pushing from a table, turning her arm out, carrying, repetitive actions, and reaching behind her back  Objective: See treatment diary below      Assessment: Tolerated treatment well  Patient demonstrated fatigue post treatment, exhibited good technique with therapeutic exercises and would benefit from continued PT    Pt has demonstrated improved R shoulder pain, irritability, ROM, strength, activity tolerance, and functional mobility since beginning PT intervention  She continues to demonstrate R shoulder pain, ROM deficits, weakness, activity tolerance; encouraged to continue with HEP in the time being until follow up-with Dr Brittany Mitchell pt on MRI imaging and potential for labral/instability finding to be apparent based off of history, symptomology & presentation throughout POC  Pt notes that she may lose her medical insurance, which may create a barrier to PT intervention  Pt would benefit from continued, skilled PT to improve impairments to improve QoL  1:1 with Kwame Cortez DPT for entirety of tx  Plan: Continue per plan of care        Precautions: obesity    Pertinent Findings: Test / Measure  01/17/23 2/1/2023 2/13   FOTO (Pred 69) 42 47 45   Shoulder Elevation AROM 90 deg*  Flex = 160  ABD = 180  FATMATA = T2  FIR = T10   R shoulder elevation MMT 2+/5  3+   Labral Special Tests P  P (Zaslov)    N (Biceps Load I/II, crank, jerk, yeragson's)          Manuals 1/17 1/18 1/23 1/25 1/30 2/1 2/6 2/8 2/13    PROM  KY BV          Rhythmic Stabilization   BV    BV                                Neuro Re-Ed 1/17 1/18 1/23 1/25 1/30 2/1 2/6 2/8 2/13    TBand ER/IR   Green/Blue  2x15 ea K 3#/5# 2x15 ea K 3#/5# 2x15 ea K 3#/5# x15 ea  K 2#/5# x15 ea     Ball Wall Circles    2x15 ea 2x15 ea 2x15 ea 2x15 ea 2x15 ea     Sidelying ER             Shoulder Elevation + TBand Horz ADB Iso                                                    Pt education    BV FH BV BV  BV    Ther Ex 1/17 1/18 1/23 1/25 1/30 2/1 2/6 2/8 2/13    Pulleys  5' 5'          UBE    3'/3' 3'/3' D/C       Pendulums  x10 ea D/C          Table Slides Flex/Scaption  5" x10 ea D/C          Wall Slides with towel   20x5" flex/  15x5"  ABD  20x5" flex/  15x5"  ABD 20x5" flex/  15x5"  ABD 15x5" flex 15x5" flex     RTC Isometrics   ER/Ext 10"x10          Rows  Red x15 TBandRows/Ext  Blue 2x15 Yelitza Rows/Ext 2x15 #15 [place] Rows/Ext 2x15 #15 [place] Rows/Ext 2x15 #15 [place] Rows/Ext 2x15 #15 [place] Rows/Ext 2x15 #15/12#     Scap Retrac    5"x10           Standing ITYs    2# x15 ea 2# x15 ea   2# x15 ea     IR str c Str    30"x3 30''x3        UT st      30''x3        Active Shoulder elevation + TBand Horz ABD ISO      OTB x8 patrial range                    Ther Activity 1/17 1/18 1/23 1/25 2/1 2/6 2/8 2/13                              Gait Training 1/17 1/18 1/23 1/25 2/1 2/6 2/8 2/13                              Modalities 1/17 1/18 1/23 2/1 2/6 2/8 2/13

## 2023-02-13 NOTE — PROGRESS NOTES
Assessment/Plan:     Diagnoses and all orders for this visit:    Iron deficiency anemia, unspecified iron deficiency anemia type  Patient has a history of iron deficiency anemia  Her hemoglobin previously was 7 last year, currently 10 5  She was receiving iron infusions, iron levels were rechecked, there was not significant improvement  Endoscopy and colonoscopy were overall negative for source of bleeding  Would recommend capsule for further evaluation  Would also recommend referral to hematology for further work-up  -     Ambulatory Referral to Hematology / Oncology; Future  -     Capsule endoscopy; Future    Epigastric pain  Constipation  She did have an episode of epigastric pain recently  Would recommend right upper quadrant ultrasound to rule out biliary source  This is most likely related to her constipation  I again encouraged her to start with MiraLAX  -     US right upper quadrant; Future    We will see her back in 3 months or sooner if necessary  Subjective:      Patient ID: Sudeep Hanna is a 38 y o  female  HPI     This is a follow up for iron def anemia & to discuss her colonoscopy  Patient is [place]-speaking only and all history is via an   She has a history of GERD currently on Protonix with good control of her symptoms  She describes an episode of epigastric pain a few weeks ago  She does have a history of constipation  She was previously recommended Miralax but she hasn't tried this yet  EGD showed a 2 cm hiatal hernia otherwise normal  She has a hx of H pylori & was treat, no stool testing was performed  Bx showed the infection was treated  It did show some increased intraepitheial lymphocytes  Transglutaminase IgA and IgA were ordered, no signs of celiac  She also underwent colonoscopy at the same time and was found to have 1 polyp, pathology was benign, recommended to be repeated in 7 years time    She has required iron infusions in the past  Previously her hemoglobin was in the sevens, this was repeated and is currently 10 5  Unfortunately iron levels have not improved significantly  She does describe irregular periods and is following with GYN  Patient Active Problem List   Diagnosis   • Class 1 obesity due to excess calories with body mass index (BMI) of 34 0 to 34 9 in adult   • Abnormal uterine bleeding (AUB)   • Iron deficiency anemia   • Epigastric pain   • Other constipation     Allergies   Allergen Reactions   • Iron [Ferrous Sulfate] Abdominal Pain     Current Outpatient Medications on File Prior to Visit   Medication Sig   • naproxen (NAPROSYN) 375 mg tablet Take 1 tablet (375 mg total) by mouth 2 (two) times a day with meals   • pantoprazole (PROTONIX) 40 mg tablet Take 1 tablet (40 mg total) by mouth daily   • diphenhydrAMINE (BENADRYL) 25 mg tablet Take 1 tablet (25 mg total) by mouth every 6 (six) hours as needed for itching (Patient not taking: Reported on 2/13/2023)     No current facility-administered medications on file prior to visit       Family History   Problem Relation Age of Onset   • Diabetes Mother    • Diabetes Father    • Colon cancer Neg Hx    • Colon polyps Neg Hx    • Stomach cancer Neg Hx      Past Medical History:   Diagnosis Date   • Anemia    • Yeast infection 8/25/2022     Social History     Socioeconomic History   • Marital status: Single     Spouse name: None   • Number of children: None   • Years of education: None   • Highest education level: None   Occupational History   • None   Tobacco Use   • Smoking status: Never   • Smokeless tobacco: Never   Vaping Use   • Vaping Use: Never used   Substance and Sexual Activity   • Alcohol use: Not Currently   • Drug use: Never   • Sexual activity: Not Currently   Other Topics Concern   • None   Social History Narrative   • None     Social Determinants of Health     Financial Resource Strain: Low Risk    • Difficulty of Paying Living Expenses: Not hard at all   Food Insecurity: No Food Insecurity   • Worried About Running Out of Food in the Last Year: Never true   • Ran Out of Food in the Last Year: Never true   Transportation Needs: No Transportation Needs   • Lack of Transportation (Medical): No   • Lack of Transportation (Non-Medical): No   Physical Activity: Not on file   Stress: Not on file   Social Connections: Not on file   Intimate Partner Violence: Not on file   Housing Stability: Low Risk    • Unable to Pay for Housing in the Last Year: No   • Number of Places Lived in the Last Year: 1   • Unstable Housing in the Last Year: No     Past Surgical History:   Procedure Laterality Date   • TUBAL LIGATION           Review of Systems   All other systems reviewed and are negative  Objective:      /75   Pulse 79   Temp 98 2 °F (36 8 °C) (Tympanic)   Ht 5' 3" (1 6 m)   Wt 90 6 kg (199 lb 12 8 oz)   LMP 01/20/2023 (Exact Date)   SpO2 97%   BMI 35 39 kg/m²          Physical Exam  Constitutional:       Appearance: She is well-developed  She is obese  HENT:      Head: Normocephalic and atraumatic  Eyes:      Conjunctiva/sclera: Conjunctivae normal    Cardiovascular:      Rate and Rhythm: Normal rate and regular rhythm  Pulmonary:      Effort: Pulmonary effort is normal       Breath sounds: Normal breath sounds  Abdominal:      General: Bowel sounds are normal  There is no distension  Palpations: Abdomen is soft  Tenderness: There is no abdominal tenderness  Musculoskeletal:         General: Normal range of motion  Cervical back: Normal range of motion  Skin:     General: Skin is warm and dry  Neurological:      Mental Status: She is alert and oriented to person, place, and time     Psychiatric:         Mood and Affect: Mood normal          Behavior: Behavior normal

## 2023-02-14 ENCOUNTER — OFFICE VISIT (OUTPATIENT)
Dept: URGENT CARE | Age: 43
End: 2023-02-14

## 2023-02-14 VITALS
WEIGHT: 196 LBS | SYSTOLIC BLOOD PRESSURE: 124 MMHG | HEART RATE: 98 BPM | DIASTOLIC BLOOD PRESSURE: 74 MMHG | HEIGHT: 63 IN | RESPIRATION RATE: 18 BRPM | BODY MASS INDEX: 34.73 KG/M2 | OXYGEN SATURATION: 97 % | TEMPERATURE: 97.4 F

## 2023-02-14 DIAGNOSIS — Z02.4 DRIVER'S PERMIT PE (PHYSICAL EXAMINATION): Primary | ICD-10-CM

## 2023-02-14 RX ORDER — OMEPRAZOLE 20 MG/1
20 CAPSULE, DELAYED RELEASE ORAL DAILY
COMMUNITY

## 2023-02-14 NOTE — PROGRESS NOTES
[address] [facility]        NAME: Bladimir Romero is a 38 y o  female  : 1980    MRN: [id number]  DATE: 2023  TIME: 3:09 PM    Assessment and Plan   's permit PE (physical examination) [Z02 4]  1  's permit PE (physical examination)              Patient Instructions     Patient medically cleared for 's permit  Forms completed and given to patient today  Chief Complaint     Chief Complaint   Patient presents with   • Annual Exam     Drivers Permit Physical          History of Present Illness     Sonya Donovan is a 38 y o  female presenting to the office today for medical exam for her 's permit  Patient denies history of neurological disorders, neuropsychiatric disorders, cognitive impairment, circulatory disorders, cardiac disorders, alcohol abuse, hypertension, uncontrolled diabetes, uncontrolled epilepsy, drug abuse or any other conditions that may cause repeated lapses of consciousness  Takes Omeprazole for GERD  Review of Systems     Review of Systems   Constitutional: Negative for chills, fatigue and fever  HENT: Negative for congestion, ear pain, facial swelling, hearing loss, rhinorrhea, sinus pressure, sneezing, sore throat and trouble swallowing  Eyes: Negative for pain, redness and visual disturbance  Respiratory: Negative for cough, chest tightness, shortness of breath and wheezing  Cardiovascular: Negative for chest pain and palpitations  Gastrointestinal: Negative for abdominal pain, diarrhea, nausea and vomiting  Genitourinary: Negative for dysuria, flank pain, hematuria and pelvic pain  Musculoskeletal: Negative for arthralgias, back pain and myalgias  Skin: Negative for color change and rash  Neurological: Negative for dizziness, seizures, syncope, weakness, light-headedness and headaches  Psychiatric/Behavioral: Negative for confusion, hallucinations and sleep disturbance   The patient is not nervous/anxious  All other systems reviewed and are negative  Current Medications       Current Outpatient Medications:   •  omeprazole (PriLOSEC) 20 mg delayed release capsule, Take 20 mg by mouth daily, Disp: , Rfl:   •  diphenhydrAMINE (BENADRYL) 25 mg tablet, Take 1 tablet (25 mg total) by mouth every 6 (six) hours as needed for itching (Patient not taking: Reported on 2/13/2023), Disp: 30 tablet, Rfl: 0  •  naproxen (NAPROSYN) 375 mg tablet, Take 1 tablet (375 mg total) by mouth 2 (two) times a day with meals (Patient not taking: Reported on 2/14/2023), Disp: 20 tablet, Rfl: 0  •  pantoprazole (PROTONIX) 40 mg tablet, Take 1 tablet (40 mg total) by mouth daily (Patient not taking: Reported on 2/14/2023), Disp: 30 tablet, Rfl: 6    Current Allergies     Allergies as of 02/14/2023 - Reviewed 02/14/2023   Allergen Reaction Noted   • Iron [ferrous sulfate] Abdominal Pain 08/03/2022            The following portions of the patient's history were reviewed and updated as appropriate: allergies, current medications, past family history, past medical history, past social history, past surgical history and problem list      Past Medical History:   Diagnosis Date   • Anemia    • Yeast infection 8/25/2022       Past Surgical History:   Procedure Laterality Date   • TUBAL LIGATION         Family History   Problem Relation Age of Onset   • Diabetes Mother    • Diabetes Father    • Colon cancer Neg Hx    • Colon polyps Neg Hx    • Stomach cancer Neg Hx        Medications have been verified  Objective     /74   Pulse 98   Temp (!) 97 4 °F (36 3 °C) (Tympanic)   Resp 18   Ht 5' 2 99" (1 6 m)   Wt 88 9 kg (196 lb)   LMP 01/20/2023 (Exact Date)   SpO2 97%   BMI 34 73 kg/m²   Patient's last menstrual period was 01/20/2023 (exact date)  Physical Exam     Physical Exam  Vitals reviewed  Constitutional:       General: She is not in acute distress  Appearance: Normal appearance   She is not toxic-appearing  HENT:      Head: Normocephalic  Right Ear: Tympanic membrane normal  Tympanic membrane is not erythematous or bulging  Left Ear: Tympanic membrane normal  Tympanic membrane is not erythematous or bulging  Nose: No congestion or rhinorrhea  Right Sinus: No maxillary sinus tenderness or frontal sinus tenderness  Left Sinus: No maxillary sinus tenderness or frontal sinus tenderness  Mouth/Throat:      Pharynx: Uvula midline  No oropharyngeal exudate, posterior oropharyngeal erythema or uvula swelling  Tonsils: No tonsillar exudate or tonsillar abscesses  Eyes:      Extraocular Movements: Extraocular movements intact  Conjunctiva/sclera: Conjunctivae normal       Pupils: Pupils are equal, round, and reactive to light  Cardiovascular:      Rate and Rhythm: Normal rate and regular rhythm  Pulses: Normal pulses  Heart sounds: Normal heart sounds  Pulmonary:      Effort: Pulmonary effort is normal  No tachypnea or respiratory distress  Breath sounds: Normal breath sounds and air entry  No decreased breath sounds, wheezing, rhonchi or rales  Abdominal:      General: Bowel sounds are normal       Palpations: Abdomen is soft  Tenderness: There is no abdominal tenderness  There is no right CVA tenderness or guarding  Musculoskeletal:         General: Normal range of motion  Cervical back: Normal range of motion  Lymphadenopathy:      Cervical: No cervical adenopathy  Skin:     General: Skin is warm and dry  Neurological:      General: No focal deficit present  Mental Status: She is alert  Sensory: Sensation is intact  Motor: Motor function is intact  Coordination: Coordination is intact  Gait: Gait is intact  Deep Tendon Reflexes: Reflexes are normal and symmetric

## 2023-02-15 ENCOUNTER — OFFICE VISIT (OUTPATIENT)
Dept: PHYSICAL THERAPY | Facility: CLINIC | Age: 43
End: 2023-02-15

## 2023-02-15 DIAGNOSIS — M25.511 ACUTE PAIN OF RIGHT SHOULDER: Primary | ICD-10-CM

## 2023-02-15 DIAGNOSIS — M75.81 TENDINITIS OF RIGHT ROTATOR CUFF: ICD-10-CM

## 2023-02-15 NOTE — PROGRESS NOTES
Daily Note     Today's date: 2/15/2023  Patient name: Yisel De La Rosa  : 1980  MRN: [id number]  Referring provider: Kelin Cota MD  Dx:   Encounter Diagnosis     ICD-10-CM    1  Acute pain of right shoulder  M25 511       2  Tendinitis of right rotator cuff  M75 81           Start Time: 930  Stop Time: 55  Total time in clinic (min): 25 minutes    Subjective: pt reports that her shoulder is slowly getting better and that she is waiting for the follow-up with the Dr Zayra Luong: See treatment diary below      Assessment: Tolerated treatment well  Patient demonstrated fatigue post treatment, exhibited good technique with therapeutic exercises and would benefit from continued PT  Physician follow-up scheduled for 2023  Pt tolerated today's treatment as per appropriate response to intervention  She demonstrated painful, repetitive, reproducible "clicking" with exercises that progressed towards shoulder elevation > 90 deg, instructed pt to hold off on exercises  She demonstrated good muscle performance, carryover, and activity tolerance with exercises <90 shoulder elevation, encouraged to continue with activities  Pt would benefit from continued, skilled PT to improve impairments to improve QoL  1:1 with Colleen Coon DPT for entirety of tx  Plan: Continue per plan of care        Precautions: obesity    Pertinent Findings:                                                                                                                                                     Test / Measure  23   FOTO (Pred 69) 42 47 45   Shoulder Elevation AROM 90 deg*  Flex = 160  ABD = 180  FATMATA = T2  FIR = T10   R shoulder elevation MMT 2+/5  3+   Labral Special Tests P  P (Octaviaslov)    N (Biceps Load I/II, crank, jerk, yeragson's)          Manuals 1/17 1/18 1/23 1/25 1/30 2/1 2/6 2/8 2/13 2/15    PROM  [place] BV           Rhythmic Stabilization   BV    BV Neuro Re-Ed 1/17 1/18 1/23 1/25 1/30 2/1 2/6 2/8 2/13 2/15    TBand ER/IR   Green/Blue  2x15 ea K 3#/5# 2x15 ea K 3#/5# 2x15 ea K 3#/5# x15 ea  K 2#/5# x15 ea  K 2#/5# x15 ea    Ball Wall Circles    2x15 ea 2x15 ea 2x15 ea 2x15 ea 2x15 ea  2x15 ea    Sidelying ER              Shoulder Elevation + TBand Horz ADB Iso                                                        Pt education    BV FH BV BV  BV     Ther Ex 1/17 1/18 1/23 1/25 1/30 2/1 2/6 2/8 2/13 2/15    Pulleys  5' 5'           UBE    3'/3' 3'/3' D/C        Pendulums  x10 ea D/C           Table Slides Flex/Scaption  5" x10 ea D/C           Wall Slides with towel   20x5" flex/  15x5"  ABD  20x5" flex/  15x5"  ABD 20x5" flex/  15x5"  ABD 15x5" flex 15x5" flex  15x5" flex/ABD    RTC Isometrics   ER/Ext 10"x10           Rows  Red x15 TBandRows/Ext  Blue 2x15 [place] Rows/Ext 2x15 #15 [place] Rows/Ext 2x15 #15 [place] Rows/Ext 2x15 #15 [place] Rows/Ext 2x15 #15 [place] Rows/Ext 2x15 #15/12#      Scap Retrac    5"x10            Standing ITYs    2# x15 ea 2# x15 ea   2# x15 ea  2# x15 ea  Hold on Ts   Prone Is          x15 ea Hold on TYs   IR str c Str    30"x3 30''x3         UT st      30''x3         Active Shoulder elevation + TBand Horz ABD ISO      OTB x8 patrial range                      Ther Activity 1/17 1/18 1/23 1/25  2/1 2/6 2/8 2/13 2/15                                Gait Training 1/17 1/18 1/23 1/25  2/1 2/6 2/8 2/13                                 Modalities 1/17 1/18 1/23   2/1 2/6 2/8 2/13

## 2023-02-16 ENCOUNTER — TELEPHONE (OUTPATIENT)
Dept: HEMATOLOGY ONCOLOGY | Facility: CLINIC | Age: 43
End: 2023-02-16

## 2023-02-16 NOTE — TELEPHONE ENCOUNTER
Patient has a referral on file - Made an attempt to schedule a new patient consultation  A voicemail was left making patient aware of their referral and instructing them to call back at the [facility] phone number in order to schedule their consultation

## 2023-02-20 ENCOUNTER — OFFICE VISIT (OUTPATIENT)
Dept: PHYSICAL THERAPY | Facility: CLINIC | Age: 43
End: 2023-02-20

## 2023-02-20 DIAGNOSIS — M75.81 TENDINITIS OF RIGHT ROTATOR CUFF: ICD-10-CM

## 2023-02-20 DIAGNOSIS — M25.511 ACUTE PAIN OF RIGHT SHOULDER: Primary | ICD-10-CM

## 2023-02-20 NOTE — PROGRESS NOTES
Daily Note/No Charge Visit    Today's date: 2023  Patient name: Andrew Ramirez  : 1980  MRN: [id number]  Referring provider: Walter Gambino MD  Dx:   Encounter Diagnosis     ICD-10-CM    1  Acute pain of right shoulder  M25 511       2  Tendinitis of right rotator cuff  M75 81           Start Time: 0857  Stop Time: 09  Total time in clinic (min): 23 minutes    Subjective: pt reports that her shoulder pain has not changed since last session and that he has more pain around her shoulder blade  She notes that her previous employer took her off of her insurance  Objective: See treatment diary below      Assessment: Tolerated treatment well  Patient demonstrated fatigue post treatment, exhibited good technique with therapeutic exercises and would benefit from continued PT  Pt was not seen for session today as per cost of treatment  Educated pt on PT scope of practice and interdisciplinary care regarding her condition and receiving an MRI  Provided pt with resources regarding in-network financial services as well as for [employer] [facility] services  1:1 with Venu Dillard DPT for entirety of tx  Plan: Continue per plan of care  Potential discharge next visit       Precautions: obesity    Pertinent Findings:                                                                                                                                                     Test / Measure  23   FOTO (Pred 69) 42 47 45   Shoulder Elevation AROM 90 deg*  Flex = 160  ABD = 180  FATMATA = T2  FIR = T10   R shoulder elevation MMT 2+/5  3+   Labral Special Tests P  P (Zaslov)    N (Biceps Load I/II, crank, jerk, yeragson's)          Manuals  2/ 2/6 2/8 2/13 2/15 2/20   PROM  [place] BV           Rhythmic Stabilization   BV    BV                                   Neuro Re-Ed  2/ 2/6 2/8 2/13 2/15 2/20   TBand ER/IR   Green/Blue  2x15 ea K 3#/5# 2x15 ea K 3#/5# 2x15 ea K 3#/5# x15 ea  K 2#/5# x15 ea  K 2#/5# x15 ea    Ball Wall Circles    2x15 ea 2x15 ea 2x15 ea 2x15 ea 2x15 ea  2x15 ea    Sidelying ER              Shoulder Elevation + TBand Horz ADB Iso                                                        Pt education    BV FH BV BV  BV     Ther Ex 1/17 1/18 1/23 1/25 1/30 2/1 2/6 2/8 2/13 2/15 2/20   Pulleys  5' 5'           UBE    3'/3' 3'/3' D/C        Pendulums  x10 ea D/C           Table Slides Flex/Scaption  5" x10 ea D/C           Wall Slides with towel   20x5" flex/  15x5"  ABD  20x5" flex/  15x5"  ABD 20x5" flex/  15x5"  ABD 15x5" flex 15x5" flex  15x5" flex/ABD    RTC Isometrics   ER/Ext 10"x10           Rows  Red x15 TBandRows/Ext  Blue 2x15 [place] Rows/Ext 2x15 #15 [place] Rows/Ext 2x15 #15 [place] Rows/Ext 2x15 #15 [place] Rows/Ext 2x15 #15 [place] Rows/Ext 2x15 #15/12#      Scap Retrac    5"x10            Standing ITYs    2# x15 ea 2# x15 ea   2# x15 ea  2# x15 ea    Prone Is          x15 ea    IR str c Str    30"x3 30''x3         UT st      30''x3         Active Shoulder elevation + TBand Horz ABD ISO      OTB x8 patrial range                      Ther Activity 1/17 1/18 1/23 1/25  2/1 2/6 2/8 2/13 2/15 2/20                               Gait Training 1/17 1/18 1/23 1/25  2/1 2/6 2/8 2/13 2/20                               Modalities 1/17 1/18 1/23   2/1 2/6 2/8 2/13 2/20

## 2023-02-22 ENCOUNTER — APPOINTMENT (OUTPATIENT)
Dept: PHYSICAL THERAPY | Facility: CLINIC | Age: 43
End: 2023-02-22

## 2023-03-22 ENCOUNTER — OFFICE VISIT (OUTPATIENT)
Dept: OBGYN CLINIC | Facility: CLINIC | Age: 43
End: 2023-03-22

## 2023-03-22 VITALS
DIASTOLIC BLOOD PRESSURE: 76 MMHG | WEIGHT: 202.2 LBS | SYSTOLIC BLOOD PRESSURE: 115 MMHG | HEART RATE: 88 BPM | HEIGHT: 63 IN | BODY MASS INDEX: 35.83 KG/M2

## 2023-03-22 DIAGNOSIS — N93.9 ABNORMAL UTERINE BLEEDING (AUB): Primary | ICD-10-CM

## 2023-03-22 RX ORDER — TRANEXAMIC ACID 650 MG/1
1300 TABLET ORAL 3 TIMES DAILY
Qty: 30 TABLET | Refills: 8 | Status: SHIPPED | OUTPATIENT
Start: 2023-03-22 | End: 2023-03-27

## 2023-03-22 NOTE — PROGRESS NOTES
Subjective:     [place] [employer] interpretor used     Cloyde Flavors is a 38 y o   female who presents as follow up for heavy bleeding  She was previously prescribed Lysteda, which she says was helping make the bleeding much more manageable, however she ran out of the medication and had no refills  Since running out of the medication the past few months, periods have been much heavier  Objective:    Vitals: Blood pressure 115/76, pulse 88, height 5' 3" (1 6 m), weight 91 7 kg (202 lb 3 2 oz), last menstrual period 03/10/2023  Body mass index is 35 82 kg/m²  Physical Exam  Constitutional:       General: She is not in acute distress  Appearance: She is not ill-appearing or toxic-appearing  HENT:      Head: Normocephalic and atraumatic  Cardiovascular:      Rate and Rhythm: Normal rate  Pulmonary:      Effort: Pulmonary effort is normal  No respiratory distress  Breath sounds: No stridor  Skin:     General: Skin is warm and dry  Neurological:      General: No focal deficit present  Mental Status: She is alert and oriented to person, place, and time  Mental status is at baseline  Psychiatric:         Mood and Affect: Mood normal          Thought Content: Thought content normal          Judgment: Judgment normal          Assessment/Plan:  Patient presents for follow up of heavy, regular periods  We discussed options including continued medical management with TXA vs surgery  Patient continues to decline any hormonal options at this time including Mirena IUD  She had an EMB negative for malignancy and a TVUS 8/2022 that showed a single 2 cm intramural fibroid and echogenic areas of endometrium that could be suggestive of endometrial polyps  We discussed hysteroscopy D&C and possible polypectomy with or without endometrial ablation  We also talked about hysterectomy as definitive management       Patient is most interested in [facility]  with ablation, however she is currently uninsured as she lost her job due to injury  I explained that since the TXA was working, I feel it's reasonable to continue with that and discuss ablation if the medication fails or if she obtains insurance again and desires to proceed with surgery       - Referral to CM placed to help with obtaining insurance  - Rx sent for TXA with refills  - RTO if bleeding worsens or to discuss surgical options        Yusuf Keane MD  3/22/2023  7:26 PM

## 2023-03-30 ENCOUNTER — OFFICE VISIT (OUTPATIENT)
Dept: DENTISTRY | Facility: CLINIC | Age: 43
End: 2023-03-30

## 2023-03-30 VITALS — DIASTOLIC BLOOD PRESSURE: 71 MMHG | TEMPERATURE: 98.5 F | HEART RATE: 93 BPM | SYSTOLIC BLOOD PRESSURE: 108 MMHG

## 2023-03-30 DIAGNOSIS — K04.1 NECROTIC TOOTH: Primary | ICD-10-CM

## 2023-03-30 RX ORDER — AMOXICILLIN 500 MG/1
500 CAPSULE ORAL EVERY 8 HOURS SCHEDULED
Qty: 21 CAPSULE | Refills: 0 | Status: SHIPPED | OUTPATIENT
Start: 2023-03-30 | End: 2023-04-06

## 2023-03-30 NOTE — PROGRESS NOTES
"Emergency: #30      S: CC: \"I have pain in the lower right I feel throbbing pain that is 10/10  My tooth is cold sensitive  I stopped chewing on my  due to pain  O: PA and BW taken of #30  PARL present at apex of 30 with radiolucency around furcation  Tooth has large composite restoration close to pulp  Clinically tooth is not fractured  #30: (+++) Percussion, (-) Cold, (+++) Palpation,  #20 (control): (-) Percussion, (+) Cold, (-) Palpation    [facility], [facility], Patient has non complex medical history  EO : No swelling  TMJ and mandibular range of motion WNL,   IO:  Oral hygiene fair ,  salivary glands, floor of the mouth (FOM) and tongue to be mucosa, palate, pharynx no significant findings  A: #30 Necrotic pulp with Symptomatic apical periodontitis     P: Informed Pt that #30 is savable with RCT and crown  Discussed pros, cons, and timelines related to options: 1) RCT+post/core+ crown, 2) Extraction  Pt opted to have tooth extracted  Gave pt option to have tooth taken out here on a Tuesday or to have OS referal given  Gave OS refferal  Prescribed 500 mg Amoxicillin BID for 7 days        NV: Comp exam  "

## 2023-03-30 NOTE — PROGRESS NOTES
Subjective   Patient ID: Daniel Ibarra is a 38 y o  female    Chief Complaint   Patient presents with   • Emergency/limited Exam     Reviewed medical history   ASA

## 2023-03-30 NOTE — DENTAL PROCEDURE DETAILS
Subjective   Patient ID: Mary Jeffers is a 38 y o  female  Chief Complaint   Patient presents with    Emergency/limited Exam     Reviewed medical history   ASA I     Patient presents for Comp exam & FMX, with pain #30, today treatment Limited exam and PA #30, per patient pain started about 7 days ago and is sensitive to cold, last dental exam 3yr ago  Dr Grant Post completed her exam, gave her the options 1) RCT or 2) extraction  Patient chose to have tooth extracted, we gave her a referral and copy of x-ray  She was also given a prescription for Amoxicillin 500mg 21 tablets for 7days       NV: Comp exam & FMX   Exam by Dr Grant Post

## 2023-03-31 ENCOUNTER — TELEPHONE (OUTPATIENT)
Dept: HEMATOLOGY ONCOLOGY | Facility: CLINIC | Age: 43
End: 2023-03-31

## 2023-03-31 NOTE — TELEPHONE ENCOUNTER
Reached out to patient to reschedule consult appointment, as per patient she has no coverage at this time and will call back to reschedule       Referral has been closed

## 2023-04-25 ENCOUNTER — PATIENT OUTREACH (OUTPATIENT)
Dept: OBGYN CLINIC | Facility: CLINIC | Age: 43
End: 2023-04-25

## 2023-04-25 NOTE — PROGRESS NOTES
IMLO MARTINEZ received a referral from Dr Ryan Aly for assistance with obtaining health insurance for the patient  Pt was formerly employed and had insurance through her employer, however, she had a work related injury and has been out of work for a few months  She has only been in the country for a year and a half and would not qualify for medicaid  She was getting physical therapy through her insurance, however, she was then terminated from her job and she then applied for [employer] which is what has been paying for her physical therapy  She needs physical therapy until about June/july and then at that time she does plan to start working again and will have health insurance through an employer  At that time she will call our office to re-schedule an appointment to discuss the [facility]  further  MILO MARTINEZ will close this encounter at this time, please re refer as needed

## 2023-04-27 ENCOUNTER — OFFICE VISIT (OUTPATIENT)
Dept: FAMILY MEDICINE CLINIC | Facility: CLINIC | Age: 43
End: 2023-04-27

## 2023-04-27 VITALS
BODY MASS INDEX: 35.47 KG/M2 | TEMPERATURE: 97.7 F | WEIGHT: 200.2 LBS | HEART RATE: 80 BPM | SYSTOLIC BLOOD PRESSURE: 112 MMHG | DIASTOLIC BLOOD PRESSURE: 78 MMHG | OXYGEN SATURATION: 97 % | RESPIRATION RATE: 16 BRPM | HEIGHT: 63 IN

## 2023-04-27 DIAGNOSIS — N93.9 ABNORMAL UTERINE BLEEDING (AUB): ICD-10-CM

## 2023-04-27 DIAGNOSIS — D50.9 IRON DEFICIENCY ANEMIA, UNSPECIFIED IRON DEFICIENCY ANEMIA TYPE: Primary | ICD-10-CM

## 2023-04-27 RX ORDER — FERROUS SULFATE TAB EC 324 MG (65 MG FE EQUIVALENT) 324 (65 FE) MG
324 TABLET DELAYED RESPONSE ORAL
Qty: 90 TABLET | Refills: 1 | Status: SHIPPED | OUTPATIENT
Start: 2023-04-27

## 2023-04-27 NOTE — PROGRESS NOTES
Name: Peewee Anthony      : 1980      MRN: [id number]  Encounter Provider: Yonatan Chin MD  Encounter Date: 2023   Encounter department: [address] [place], [place] [address]       1  Iron deficiency anemia, unspecified iron deficiency anemia type  Assessment & Plan:  CBC from visit on 2023 showed hemoglobin of 10 5 (improved from 7 3 after 6 Venofer infusions) and MCV of 80  Iron panel from same visit showed: iron saturation of 7, TIBC 326, iron 22, ferritin levels extremely low at 15  Values are very similar to bloodwork done prior to Venofer infusions with mild improvement  At this time, I ordered 6 more Venofer infusions, as patient likely has such severe anemia that she will require more iron to bring up her levels  TVUS 2022: single uterine fibroid, tiny vague echogenic areas within the endometrium, which may be within normal limits  However, if patient is experiencing irregular uterine bleeding, endometrial polyps are possible  Ovaries are within normal limits  Endometrial Biopsy (2022): fragments of secretory endometrium with reactive lymphoid aggregates and scattered plasma cells (MUM-1)  Negative for endometrial intraepithelial neoplasia (EIN), malignancy or lymphoproliferative disorder  Menstrual Cycles: now regular, occur monthly, and last for about 3 days  Patient denies shortness of breath  No tachycardia  States her fatigue and headaches have improved significantly since starting iron infusions  Patient has completed a set of Venofer infusions in 2022  EGD/colonoscopy performed in 2023 showed a small hiatal hernia, one sessile polyp (removed), and small internal hemorrhoids  Will require repeat in 7 years due to polyps  At this time, anemia likely secondary to heavy menstrual bleeding due to overall negative EGD/colonoscopy       - Continue Protonix 40 mg daily   - Ordered 6 more Venofer infusions- patient states she currently does not have insurance and will complete these once she does  Will order oral ferrous sulfate for now to be taken with vitamin C  She reports she gets abdominal pain with these supplements at times- advised her to take it every other day and see how she feels  - Continue following with GI; appreciate recommendations  Orders:  -     ferrous sulfate 324 (65 Fe) mg; Take 1 tablet (324 mg total) by mouth 2 (two) times a day before meals    2  Abnormal uterine bleeding (AUB)  Assessment & Plan:  Likely the cause of her anemia  Significantly improved since starting TXA by OB/GYN  Patient reports her periods only last 3 days now and they are much lighter than they were before  BMI Counseling: Body mass index is 35 46 kg/m²  The BMI is above normal  Nutrition recommendations include decreasing portion sizes, encouraging healthy choices of fruits and vegetables, limiting drinks that contain sugar, moderation in carbohydrate intake, reducing intake of saturated and trans fat and reducing intake of cholesterol  Exercise recommendations include moderate physical activity 150 minutes/week and exercising 3-5 times per week  No pharmacotherapy was ordered  Patient referred to PCP  Rationale for BMI follow-up plan is due to patient being overweight or obese  Health Maintenance:    - Patient has not yet gotten COVID vaccine and advised to do so  - Pap smear from 12/2022 negative for malignancy as well as HPV   - Mammogram scheduled for 5/3/2023  Subjective      This is a very pleasant 46 y o  female with PMH of iron deficiency anemia who presents to the clinic for management of their chronic medical conditions  Patient's medical conditions are stable unless noted otherwise above  Patient has not had any recent hospitalizations or medical emergencies since last visit  Patient has no further complaints other than what is mentioned in the review of systems    Zuleika Jackson "[id number] used for this visit  Anemia  There has been no abdominal pain, fever or palpitations  Review of Systems   Constitutional: Negative for activity change, appetite change, chills, fatigue and fever  HENT: Negative for sore throat  Respiratory: Negative for cough and shortness of breath  Cardiovascular: Negative for chest pain, palpitations and leg swelling  Gastrointestinal: Negative for abdominal pain, constipation, diarrhea, nausea and vomiting  Musculoskeletal: Negative for back pain and gait problem  Neurological: Negative for dizziness, seizures, weakness and headaches  Current Outpatient Medications on File Prior to Visit   Medication Sig   • diphenhydrAMINE (BENADRYL) 25 mg tablet Take 1 tablet (25 mg total) by mouth every 6 (six) hours as needed for itching   • naproxen (NAPROSYN) 375 mg tablet Take 1 tablet (375 mg total) by mouth 2 (two) times a day with meals   • pantoprazole (PROTONIX) 40 mg tablet Take 1 tablet (40 mg total) by mouth daily   • [DISCONTINUED] omeprazole (PriLOSEC) 20 mg delayed release capsule Take 20 mg by mouth daily       Objective     /78 (BP Location: Left arm, Patient Position: Sitting, Cuff Size: Adult)   Pulse 80   Temp 97 7 °F (36 5 °C) (Temporal)   Resp 16   Ht 5' 3\" (1 6 m)   Wt 90 8 kg (200 lb 3 2 oz)   LMP 04/11/2023 (Within Days)   SpO2 97%   BMI 35 46 kg/m²     Physical Exam  Vitals reviewed  Constitutional:       General: She is not in acute distress  Appearance: She is well-developed  She is not ill-appearing or diaphoretic  HENT:      Head: Normocephalic and atraumatic  Nose: No congestion or rhinorrhea  Mouth/Throat:      Mouth: Mucous membranes are moist       Pharynx: Oropharynx is clear  No oropharyngeal exudate or posterior oropharyngeal erythema  Eyes:      General: No scleral icterus  Right eye: No discharge  Left eye: No discharge        Conjunctiva/sclera: Conjunctivae normal       " Comments: No pallor noted   Cardiovascular:      Rate and Rhythm: Normal rate and regular rhythm  Heart sounds: Normal heart sounds  No murmur heard  No friction rub  No gallop  Pulmonary:      Effort: Pulmonary effort is normal  No respiratory distress  Breath sounds: Normal breath sounds  No wheezing or rales  Abdominal:      General: Bowel sounds are normal  There is no distension  Palpations: Abdomen is soft  There is no mass  Tenderness: There is no abdominal tenderness  There is no guarding  Musculoskeletal:         General: No tenderness or deformity  Normal range of motion  Cervical back: Normal range of motion  Right lower leg: No edema  Left lower leg: No edema  Skin:     General: Skin is warm and dry  Neurological:      General: No focal deficit present  Mental Status: She is alert and oriented to person, place, and time            Jackson Trujillo MD   4/27/2023

## 2023-04-27 NOTE — ASSESSMENT & PLAN NOTE
Patient reports having regular bowel movements since starting Miralax  - Continue Miralax PRN to achieve regular bowel movements

## 2023-04-27 NOTE — ASSESSMENT & PLAN NOTE
CBC from visit on 1/2023 showed hemoglobin of 10 5 (improved from 7 3 after 6 Venofer infusions) and MCV of 80  Iron panel from same visit showed: iron saturation of 7, TIBC 326, iron 22, ferritin levels extremely low at 15  Values are very similar to bloodwork done prior to Venofer infusions with mild improvement  At this time, I ordered 6 more Venofer infusions, as patient likely has such severe anemia that she will require more iron to bring up her levels  TVUS 8/2022: single uterine fibroid, tiny vague echogenic areas within the endometrium, which may be within normal limits  However, if patient is experiencing irregular uterine bleeding, endometrial polyps are possible  Ovaries are within normal limits  Endometrial Biopsy (8/2022): fragments of secretory endometrium with reactive lymphoid aggregates and scattered plasma cells (MUM-1)  Negative for endometrial intraepithelial neoplasia (EIN), malignancy or lymphoproliferative disorder  Menstrual Cycles: now regular, occur monthly, and last for about 3 days  Patient denies shortness of breath  No tachycardia  States her fatigue and headaches have improved significantly since starting iron infusions  Patient has completed a set of Venofer infusions in November 2022  EGD/colonoscopy performed in 1/2023 showed a small hiatal hernia, one sessile polyp (removed), and small internal hemorrhoids  Will require repeat in 7 years due to polyps  At this time, anemia likely secondary to heavy menstrual bleeding due to overall negative EGD/colonoscopy  - Continue Protonix 40 mg daily   - Ordered 6 more Venofer infusions- patient states she currently does not have insurance and will complete these once she does  Will order oral ferrous sulfate for now to be taken with vitamin C  She reports she gets abdominal pain with these supplements at times- advised her to take it every other day and see how she feels    - Continue following with GI; appreciate recommendations

## 2023-04-27 NOTE — ASSESSMENT & PLAN NOTE
Likely the cause of her anemia  Significantly improved since starting TXA by OB/GYN  Patient reports her periods only last 3 days now and they are much lighter than they were before

## 2023-12-28 DIAGNOSIS — R10.13 EPIGASTRIC PAIN: ICD-10-CM

## 2024-01-01 NOTE — ASSESSMENT & PLAN NOTE
""Journee" Wet mount/KOH significant for moderate hyphae  Diflucan prescribed

## 2024-01-02 DIAGNOSIS — R10.13 EPIGASTRIC PAIN: ICD-10-CM

## 2024-01-02 RX ORDER — PANTOPRAZOLE SODIUM 40 MG/1
40 TABLET, DELAYED RELEASE ORAL DAILY
Qty: 60 TABLET | Refills: 3 | OUTPATIENT
Start: 2024-01-02

## 2024-01-02 RX ORDER — PANTOPRAZOLE SODIUM 40 MG/1
40 TABLET, DELAYED RELEASE ORAL DAILY
Qty: 30 TABLET | Refills: 6 | Status: CANCELLED | OUTPATIENT
Start: 2024-01-02

## 2024-01-02 RX ORDER — PANTOPRAZOLE SODIUM 40 MG/1
40 TABLET, DELAYED RELEASE ORAL DAILY
Qty: 30 TABLET | Refills: 6 | Status: SHIPPED | OUTPATIENT
Start: 2024-01-02

## 2024-01-03 NOTE — PROGRESS NOTES
Patient has been using pantoprazole long term.  Given diagnosis of hiatal hernia( seen in Endoscopy). It is reasonable to refill medication today however patient has not been seen since 4/2023 and is advisable for her to have an appointment.

## 2024-01-04 ENCOUNTER — APPOINTMENT (OUTPATIENT)
Dept: LAB | Facility: CLINIC | Age: 44
End: 2024-01-04

## 2024-01-04 ENCOUNTER — OFFICE VISIT (OUTPATIENT)
Dept: OBGYN CLINIC | Facility: CLINIC | Age: 44
End: 2024-01-04

## 2024-01-04 VITALS
DIASTOLIC BLOOD PRESSURE: 77 MMHG | SYSTOLIC BLOOD PRESSURE: 120 MMHG | BODY MASS INDEX: 35.46 KG/M2 | WEIGHT: 200.2 LBS | HEART RATE: 97 BPM

## 2024-01-04 DIAGNOSIS — B96.89 BACTERIAL VAGINITIS: Primary | ICD-10-CM

## 2024-01-04 DIAGNOSIS — N76.0 BACTERIAL VAGINITIS: Primary | ICD-10-CM

## 2024-01-04 DIAGNOSIS — N89.8 VAGINAL ITCHING: ICD-10-CM

## 2024-01-04 LAB
BV WHIFF TEST VAG QL: ABNORMAL
CLUE CELLS SPEC QL WET PREP: ABNORMAL
HBV SURFACE AG SER QL: NORMAL
HCV AB SER QL: NORMAL
PH SMN: 5 [PH]
SL AMB POCT WET MOUNT: ABNORMAL
T VAGINALIS VAG QL WET PREP: ABNORMAL
TREPONEMA PALLIDUM IGG+IGM AB [PRESENCE] IN SERUM OR PLASMA BY IMMUNOASSAY: NORMAL
YEAST VAG QL WET PREP: ABNORMAL

## 2024-01-04 PROCEDURE — 87210 SMEAR WET MOUNT SALINE/INK: CPT | Performed by: OBSTETRICS & GYNECOLOGY

## 2024-01-04 PROCEDURE — 86780 TREPONEMA PALLIDUM: CPT

## 2024-01-04 PROCEDURE — 99213 OFFICE O/P EST LOW 20 MIN: CPT | Performed by: OBSTETRICS & GYNECOLOGY

## 2024-01-04 PROCEDURE — 36415 COLL VENOUS BLD VENIPUNCTURE: CPT

## 2024-01-04 PROCEDURE — 87491 CHLMYD TRACH DNA AMP PROBE: CPT

## 2024-01-04 PROCEDURE — 87591 N.GONORRHOEAE DNA AMP PROB: CPT

## 2024-01-04 PROCEDURE — 86803 HEPATITIS C AB TEST: CPT

## 2024-01-04 PROCEDURE — 87340 HEPATITIS B SURFACE AG IA: CPT

## 2024-01-04 PROCEDURE — 87389 HIV-1 AG W/HIV-1&-2 AB AG IA: CPT

## 2024-01-04 RX ORDER — METRONIDAZOLE 500 MG/1
500 TABLET ORAL EVERY 12 HOURS SCHEDULED
Qty: 14 TABLET | Refills: 0 | Status: SHIPPED | OUTPATIENT
Start: 2024-01-04 | End: 2024-01-11

## 2024-01-04 NOTE — PROGRESS NOTES
Subjective:     Delia Sarahi MurilloReyes is a 43 y.o. who presents with vaginal itching and thick white discharge. Patient reports vaginal itching for the past 2 months. It started before she had intercourse with her current partner. She also notes a fishy odor. She denies nausea, vomiting, SOB, chest pain, fever.    Objective:    Vitals: Blood pressure 120/77, pulse 97, weight 90.8 kg (200 lb 3.2 oz), last menstrual period 12/19/2023.Body mass index is 35.46 kg/m².    Physical Exam  Constitutional:       Appearance: Normal appearance.   Cardiovascular:      Rate and Rhythm: Normal rate.   Pulmonary:      Effort: Pulmonary effort is normal.   Abdominal:      Palpations: Abdomen is soft.      Tenderness: There is no abdominal tenderness. There is no guarding or rebound.   Genitourinary:     Cervix: Discharge (minimal white discharge) present. No friability, lesion, erythema or cervical bleeding.   Neurological:      Mental Status: She is alert.       [place]:  pH 5.0  Positive for clue cells  Negative for trichomonads or yeast    Assessment/Plan:  Delia MurilloReyes is a 43y female who presents with bacterial vaginosis.     - follow-up GC/CT  - labs for HIV, hep B, hep C, Syphilis ordered  - Flagyl 500mg BID x7days sent to pharmacy  - Pt to return in 6 mo for annual visit    Gayle Carroll MD  1/4/2024  2:00 PM

## 2024-01-05 LAB
C TRACH DNA SPEC QL NAA+PROBE: NEGATIVE
HIV 1+2 AB+HIV1 P24 AG SERPL QL IA: NORMAL
HIV 2 AB SERPL QL IA: NORMAL
HIV1 AB SERPL QL IA: NORMAL
HIV1 P24 AG SERPL QL IA: NORMAL
N GONORRHOEA DNA SPEC QL NAA+PROBE: NEGATIVE

## 2024-01-07 NOTE — PROGRESS NOTES
Name: Delia Sarahi MurilloReyes      : 1980      MRN: [id number]  Encounter Provider: Kirsten Beasley MD  Encounter Date: 2024   Encounter department: [facility]    Assessment & Plan     1. Epigastric pain  Assessment & Plan:  Stable, no red flags   Has history of H. Pylori infection in the past, post treatment test not perform after treatment  Due to symptoms and risk factors can't exclude gall bladder disorder     Plan  H. Pylori test  Hold pantoprazole until H. Pylori test is performed  Will treat symptoms with Femotidine in the meantime   Abdomen US to rule out gall bladder disorder     Orders:  -     H. pylori antigen, stool; Future  -     US abdomen complete; Future; Expected date: 2024  -     famotidine (PEPCID) 20 mg tablet; Take 1 tablet (20 mg total) by mouth 2 (two) times a day           Subjective      Sonya is a 43-year-old female with past medical history of [facility], iron deficiency, obesity who presents today for evaluation of abdominal pain. She reports abdominal pain after running out of pantoprazole 3 weeks ago. States that pain is associated with reflux, nausea and heartburn. Pain worsens after meals. No vomiting, diarrhea, weight loss, or blood in the stool. Has not tried OTC medications. Patient has history of H.pylori infection in the past.       Review of Systems   Constitutional:  Negative for chills and fever.   HENT:  Negative for congestion and sore throat.    Eyes:  Negative for pain and visual disturbance.   Respiratory:  Negative for cough and shortness of breath.    Cardiovascular:  Negative for chest pain and palpitations.   Gastrointestinal:  Positive for abdominal pain and nausea. Negative for constipation, diarrhea and vomiting.   Genitourinary:  Negative for dysuria and hematuria.   Musculoskeletal:  Negative for arthralgias and back pain.   Skin:  Negative for color change and rash.   Neurological:  Negative for seizures and  "syncope.   All other systems reviewed and are negative.      Current Outpatient Medications on File Prior to Visit   Medication Sig    diphenhydrAMINE (BENADRYL) 25 mg tablet Take 1 tablet (25 mg total) by mouth every 6 (six) hours as needed for itching    ferrous sulfate 324 (65 Fe) mg Take 1 tablet (324 mg total) by mouth 2 (two) times a day before meals (Patient not taking: Reported on 1/4/2024)    metroNIDAZOLE (FLAGYL) 500 mg tablet Take 1 tablet (500 mg total) by mouth every 12 (twelve) hours for 7 days    naproxen (NAPROSYN) 375 mg tablet Take 1 tablet (375 mg total) by mouth 2 (two) times a day with meals (Patient not taking: Reported on 1/4/2024)    pantoprazole (PROTONIX) 40 mg tablet Take 1 tablet (40 mg total) by mouth daily       Objective     /64 (BP Location: Left arm, Patient Position: Sitting, Cuff Size: Large)   Pulse 81   Temp 99 °F (37.2 °C) (Temporal)   Resp 16   Ht 5' 3\" (1.6 m)   Wt 90.3 kg (199 lb)   LMP 12/19/2023 (Exact Date)   SpO2 99%   BMI 35.25 kg/m²     Physical Exam  Constitutional:       General: She is not in acute distress.     Appearance: Normal appearance. She is obese. She is not toxic-appearing.   HENT:      Head: Normocephalic and atraumatic.      Right Ear: External ear normal.      Left Ear: External ear normal.      Nose: Nose normal. No congestion or rhinorrhea.   Eyes:      General: No scleral icterus.     Conjunctiva/sclera: Conjunctivae normal.   Cardiovascular:      Rate and Rhythm: Normal rate and regular rhythm.      Pulses: Normal pulses.      Heart sounds: Normal heart sounds. No murmur heard.     No gallop.   Pulmonary:      Effort: Pulmonary effort is normal. No respiratory distress.      Breath sounds: Normal breath sounds. No wheezing or rales.   Abdominal:      General: Abdomen is flat. Bowel sounds are normal. There is no distension.      Palpations: Abdomen is soft.      Tenderness: There is no abdominal tenderness. There is no guarding. "   Musculoskeletal:      Cervical back: Normal range of motion and neck supple.      Right lower leg: No edema.      Left lower leg: No edema.   Skin:     General: Skin is warm and dry.      Capillary Refill: Capillary refill takes less than 2 seconds.      Coloration: Skin is not jaundiced or pale.   Neurological:      General: No focal deficit present.      Mental Status: She is alert and oriented to person, place, and time.   Psychiatric:         Mood and Affect: Mood normal.         Behavior: Behavior normal.       Kirsten Beasley MD

## 2024-01-08 ENCOUNTER — OFFICE VISIT (OUTPATIENT)
Dept: FAMILY MEDICINE CLINIC | Facility: CLINIC | Age: 44
End: 2024-01-08

## 2024-01-08 VITALS
TEMPERATURE: 99 F | DIASTOLIC BLOOD PRESSURE: 64 MMHG | HEART RATE: 81 BPM | HEIGHT: 63 IN | SYSTOLIC BLOOD PRESSURE: 100 MMHG | OXYGEN SATURATION: 99 % | WEIGHT: 199 LBS | RESPIRATION RATE: 16 BRPM | BODY MASS INDEX: 35.26 KG/M2

## 2024-01-08 DIAGNOSIS — R10.13 EPIGASTRIC PAIN: Primary | ICD-10-CM

## 2024-01-08 PROBLEM — R10.9 ABDOMINAL PAIN: Status: ACTIVE | Noted: 2024-01-08

## 2024-01-08 PROCEDURE — 99213 OFFICE O/P EST LOW 20 MIN: CPT | Performed by: FAMILY MEDICINE

## 2024-01-08 RX ORDER — FAMOTIDINE 20 MG/1
20 TABLET, FILM COATED ORAL 2 TIMES DAILY
Qty: 180 TABLET | Refills: 3 | Status: SHIPPED | OUTPATIENT
Start: 2024-01-08 | End: 2025-01-02

## 2024-01-08 NOTE — ASSESSMENT & PLAN NOTE
Stable, no red flags   Has history of H. Pylori infection in the past, post treatment test not perform after treatment  Due to symptoms and risk factors can't exclude gall bladder disorder     Plan  H. Pylori test  Hold pantoprazole until H. Pylori test is performed  Will treat symptoms with Femotidine in the meantime   Abdomen US to rule out gall bladder disorder

## 2024-01-11 NOTE — RESULT ENCOUNTER NOTE
Please let patient know that her STI screening was negative. This included chlamydia, gonorrhea, HIV, Syphilis, Hep B and C. Thank you! RN called to report that Pt has Keppra 1500 mg XR ordered but Pt chews and crush tablets in mouth. RN noticed that with other night meds, that's how pt was taking it but As per pharmacist instruction to RN pt can't chew or crush Keppra XR. D/w overnight pharmacist and as per pharmacist recommendation Keppra 1500 mg oral solution 2 x a day ordered. D/w RN.     Hospitalist and Neurologist to eval. pt in AM and modify dose or route of Keppra if needed.

## 2024-03-12 ENCOUNTER — APPOINTMENT (OUTPATIENT)
Dept: LAB | Facility: CLINIC | Age: 44
End: 2024-03-12

## 2024-03-12 DIAGNOSIS — R10.13 EPIGASTRIC PAIN: ICD-10-CM

## 2024-03-12 PROCEDURE — 87338 HPYLORI STOOL AG IA: CPT

## 2024-03-13 LAB — H PYLORI AG STL QL IA: NEGATIVE

## 2024-03-17 NOTE — PROGRESS NOTES
Assessment/Plan:    Abdominal pain  Jan 2024, STOOL TEST NEGATIVE H PYLORI   Ultrasound abdomen was previously ordered, but not performed at last visit for ab pain   Jan 2023- Endoscopy and colonoscopy were overall negative for source of bleeding  Feb 2023 -  GI ordered US abdomen to eval for gallbladder disease and referred to Hematology. Due to pt being self-pay was unable to make appointment. Was supposed to FU with GI in 3 months for capsule endoscopy.     PLAN:   Refill pantoprazole  GERD DIET  GI referral   Patient encourage to complete US , given Central Scheduling info [phone]        Iron deficiency anemia  Lab Results   Component Value Date    WBC 9.09 01/30/2023    HGB 10.5 (L) 01/30/2023    HCT 34.4 (L) 01/30/2023    MCV 80 (L) 01/30/2023     01/30/2023     Plan:   repeat CBC   Iron panel      Does not have health insurance  Plan:  Instructed patient to meet with financial counselors in the [facility] at her earliest convenience for aid in obtaining insurance.     Encounter for screening mammogram for malignant neoplasm of breast  Plan:   FU in 1 month for annual physical   Will discuss mammogram at next visit      Diagnoses and all orders for this visit:    Iron deficiency anemia, unspecified iron deficiency anemia type  -     CBC and differential; Future  -     Iron Panel (Includes Ferritin, Iron Sat%, Iron, and TIBC); Future  -     Ambulatory Referral to Gastroenterology; Future    Epigastric pain  -     pantoprazole (PROTONIX) 40 mg tablet; Take 1 tablet (40 mg total) by mouth daily  -     US right upper quadrant; Future  -     Iron Panel (Includes Ferritin, Iron Sat%, Iron, and TIBC); Future  -     Ambulatory Referral to Gastroenterology; Future    Encounter for immunization  -     influenza vaccine, quadrivalent, 0.5 mL, preservative-free, for adult and pediatric patients 6 mos+ (AFLURIA, FLUARIX, FLULAVAL, FLUZONE)    Does not have health insurance    Encounter for screening  "mammogram for malignant neoplasm of breast          Subjective:      Patient ID: Delia Sarahi MurilloReyes is a 43 y.o. female.    HPI    She has started having this ab pain for 7 years ago, in the epigastric region described as burning and \"inflammation\". Associated with burning rising up throat and sometimes taste a sour taste in the back of her mouth. It is painful with any water, or any food. She was using the pantoprazole and it was helping. Pain does not radiate to other parts of the abdomen.   Patient has her period every month, and sees a gynecologist. They last about 3 days and he flow is not heavy. She uses 3 pads per day, and they are not fully soaked.     She denies any SOB with minimal effort, chest pain, bleeding from gums, nose, urine or feces.     She has no insurance and was unable to follow up with GI further and hematology due to cost. Will be able to see financial counselors in the office.      The following portions of the patient's history were reviewed and updated as appropriate: current medications and problem list.    Review of Systems   Constitutional:  Negative for chills and fever.   HENT:  Negative for ear pain, nosebleeds and sore throat.    Eyes:  Negative for pain and visual disturbance.   Respiratory:  Negative for cough and shortness of breath.    Cardiovascular:  Negative for chest pain and palpitations.   Gastrointestinal:  Positive for abdominal pain. Negative for vomiting.   Genitourinary:  Negative for dysuria and hematuria.   Musculoskeletal:  Negative for arthralgias and back pain.   Skin:  Negative for color change and rash.   Neurological:  Negative for seizures and syncope.   All other systems reviewed and are negative.        Objective:      /70 (BP Location: Right arm, Patient Position: Sitting, Cuff Size: Standard)   Pulse 93   Temp 98.4 °F (36.9 °C) (Temporal)   Resp 16   Ht 5' 3\" (1.6 m)   Wt 92.5 kg (204 lb)   LMP 03/15/2024   SpO2 97%   Breastfeeding " No   BMI 36.14 kg/m²          Physical Exam  Constitutional:       General: She is not in acute distress.     Appearance: She is not toxic-appearing.   HENT:      Head: Normocephalic and atraumatic.      Right Ear: External ear normal.      Left Ear: External ear normal.      Nose: Nose normal. No congestion or rhinorrhea.      Mouth/Throat:      Mouth: Mucous membranes are moist.      Pharynx: Oropharynx is clear.   Eyes:      General: No scleral icterus.     Conjunctiva/sclera: Conjunctivae normal.      Comments: Pale palpebral conjunctiva bilaterally   Cardiovascular:      Rate and Rhythm: Normal rate and regular rhythm.      Pulses: Normal pulses.      Heart sounds: Normal heart sounds. No murmur heard.  Pulmonary:      Effort: Pulmonary effort is normal. No respiratory distress.      Breath sounds: Normal breath sounds. No wheezing.   Chest:      Chest wall: No tenderness.   Abdominal:      General: Abdomen is flat. There is no distension.      Palpations: Abdomen is soft. There is no mass.      Tenderness: There is abdominal tenderness. There is no guarding.      Comments: Tenderness to deep palpation, 5-6/10 epigastric. Mild pain in LUQ with deep palpation. No radiation of pain.   Musculoskeletal:         General: Normal range of motion.      Cervical back: Normal range of motion and neck supple.      Right lower leg: No edema.      Left lower leg: No edema.   Skin:     General: Skin is warm.      Capillary Refill: Capillary refill takes less than 2 seconds.      Coloration: Skin is not jaundiced or pale.      Findings: No bruising.   Neurological:      General: No focal deficit present.      Mental Status: She is alert and oriented to person, place, and time.   Psychiatric:         Mood and Affect: Mood normal.

## 2024-03-17 NOTE — ASSESSMENT & PLAN NOTE
Jan 2024, STOOL TEST NEGATIVE H PYLORI   Ultrasound abdomen was previously ordered, but not performed at last visit for ab pain   Jan 2023- Endoscopy and colonoscopy were overall negative for source of bleeding  Feb 2023 -  GI ordered US abdomen to eval for gallbladder disease and referred to Hematology. Due to pt being self-pay was unable to make appointment. Was supposed to FU with GI in 3 months for capsule endoscopy.     PLAN:   Refill pantoprazole  GERD DIET  GI referral   Patient encourage to complete US , given Central Scheduling info [phone]

## 2024-03-19 ENCOUNTER — OFFICE VISIT (OUTPATIENT)
Dept: FAMILY MEDICINE CLINIC | Facility: CLINIC | Age: 44
End: 2024-03-19

## 2024-03-19 VITALS
HEIGHT: 63 IN | DIASTOLIC BLOOD PRESSURE: 70 MMHG | BODY MASS INDEX: 36.14 KG/M2 | RESPIRATION RATE: 16 BRPM | OXYGEN SATURATION: 97 % | WEIGHT: 204 LBS | SYSTOLIC BLOOD PRESSURE: 112 MMHG | HEART RATE: 93 BPM | TEMPERATURE: 98.4 F

## 2024-03-19 DIAGNOSIS — R10.13 EPIGASTRIC PAIN: ICD-10-CM

## 2024-03-19 DIAGNOSIS — Z23 ENCOUNTER FOR IMMUNIZATION: ICD-10-CM

## 2024-03-19 DIAGNOSIS — Z12.31 ENCOUNTER FOR SCREENING MAMMOGRAM FOR MALIGNANT NEOPLASM OF BREAST: ICD-10-CM

## 2024-03-19 DIAGNOSIS — Z59.89 DOES NOT HAVE HEALTH INSURANCE: ICD-10-CM

## 2024-03-19 DIAGNOSIS — D50.9 IRON DEFICIENCY ANEMIA, UNSPECIFIED IRON DEFICIENCY ANEMIA TYPE: Primary | ICD-10-CM

## 2024-03-19 PROBLEM — Z59.71 DOES NOT HAVE HEALTH INSURANCE: Status: ACTIVE | Noted: 2024-03-19

## 2024-03-19 PROCEDURE — 99214 OFFICE O/P EST MOD 30 MIN: CPT | Performed by: FAMILY MEDICINE

## 2024-03-19 PROCEDURE — 90471 IMMUNIZATION ADMIN: CPT | Performed by: FAMILY MEDICINE

## 2024-03-19 PROCEDURE — 90686 IIV4 VACC NO PRSV 0.5 ML IM: CPT | Performed by: FAMILY MEDICINE

## 2024-03-19 RX ORDER — PANTOPRAZOLE SODIUM 40 MG/1
40 TABLET, DELAYED RELEASE ORAL DAILY
Qty: 30 TABLET | Refills: 6 | Status: SHIPPED | OUTPATIENT
Start: 2024-03-19

## 2024-03-19 SDOH — ECONOMIC STABILITY - INCOME SECURITY: OTHER PROBLEMS RELATED TO HOUSING AND ECONOMIC CIRCUMSTANCES: Z59.89

## 2024-03-19 NOTE — ASSESSMENT & PLAN NOTE
Lab Results   Component Value Date    WBC 9.09 01/30/2023    HGB 10.5 (L) 01/30/2023    HCT 34.4 (L) 01/30/2023    MCV 80 (L) 01/30/2023     01/30/2023     Plan:   repeat CBC   Iron panel

## 2024-03-20 PROBLEM — Z12.31 ENCOUNTER FOR SCREENING MAMMOGRAM FOR MALIGNANT NEOPLASM OF BREAST: Status: ACTIVE | Noted: 2024-03-20

## 2024-03-20 NOTE — ASSESSMENT & PLAN NOTE
Plan:  Instructed patient to meet with financial counselors in the [facility] at her earliest convenience for aid in obtaining insurance.

## 2024-04-03 DIAGNOSIS — R10.13 EPIGASTRIC PAIN: ICD-10-CM

## 2024-04-04 RX ORDER — PANTOPRAZOLE SODIUM 40 MG/1
40 TABLET, DELAYED RELEASE ORAL DAILY
Qty: 90 TABLET | Refills: 3 | Status: SHIPPED | OUTPATIENT
Start: 2024-04-04

## 2024-04-13 ENCOUNTER — HOSPITAL ENCOUNTER (EMERGENCY)
Facility: HOSPITAL | Age: 44
Discharge: HOME | End: 2024-04-13
Attending: EMERGENCY MEDICINE

## 2024-04-13 ENCOUNTER — APPOINTMENT (EMERGENCY)
Dept: ULTRASOUND IMAGING | Facility: HOSPITAL | Age: 44
End: 2024-04-13

## 2024-04-13 VITALS
BODY MASS INDEX: 35.77 KG/M2 | HEART RATE: 71 BPM | OXYGEN SATURATION: 97 % | SYSTOLIC BLOOD PRESSURE: 123 MMHG | WEIGHT: 201.94 LBS | TEMPERATURE: 97.2 F | RESPIRATION RATE: 16 BRPM | DIASTOLIC BLOOD PRESSURE: 73 MMHG

## 2024-04-13 DIAGNOSIS — R10.11 RIGHT UPPER QUADRANT ABDOMINAL PAIN: Primary | ICD-10-CM

## 2024-04-13 DIAGNOSIS — K80.20 CHOLELITHIASIS: ICD-10-CM

## 2024-04-13 LAB
ALBUMIN SERPL BCP-MCNC: 3.9 G/DL (ref 3.5–5)
ALP SERPL-CCNC: 72 U/L (ref 34–104)
ALT SERPL W P-5'-P-CCNC: 12 U/L (ref 7–52)
AMORPH PHOS CRY URNS QL MICRO: ABNORMAL /HPF
ANION GAP SERPL CALCULATED.3IONS-SCNC: 9 MMOL/L (ref 4–13)
AST SERPL W P-5'-P-CCNC: 29 U/L (ref 13–39)
BACTERIA UR QL AUTO: ABNORMAL /HPF
BASOPHILS # BLD AUTO: 0.05 THOUSANDS/ÂΜL (ref 0–0.1)
BASOPHILS NFR BLD AUTO: 1 % (ref 0–1)
BILIRUB SERPL-MCNC: 0.34 MG/DL (ref 0.2–1)
BILIRUB UR QL STRIP: NEGATIVE
BUN SERPL-MCNC: 14 MG/DL (ref 5–25)
CALCIUM SERPL-MCNC: 9 MG/DL (ref 8.4–10.2)
CHLORIDE SERPL-SCNC: 103 MMOL/L (ref 96–108)
CLARITY UR: CLEAR
CO2 SERPL-SCNC: 22 MMOL/L (ref 21–32)
COLOR UR: ABNORMAL
CREAT SERPL-MCNC: 0.66 MG/DL (ref 0.6–1.3)
EOSINOPHIL # BLD AUTO: 0.04 THOUSAND/ÂΜL (ref 0–0.61)
EOSINOPHIL NFR BLD AUTO: 0 % (ref 0–6)
GFR SERPL CREATININE-BSD FRML MDRD: 110 ML/MIN/1.73SQ M
GLUCOSE SERPL-MCNC: 117 MG/DL (ref 65–140)
GLUCOSE UR STRIP-MCNC: NEGATIVE MG/DL
HCT VFR BLD AUTO: 35.4 % (ref 34.8–46.1)
HGB BLD-MCNC: 10.2 G/DL (ref 11.5–15.4)
HGB UR QL STRIP.AUTO: NEGATIVE
IMM GRANULOCYTES # BLD AUTO: 0.03 THOUSAND/UL (ref 0–0.2)
IMM GRANULOCYTES NFR BLD AUTO: 0 % (ref 0–2)
KETONES UR STRIP-MCNC: NEGATIVE MG/DL
LEUKOCYTE ESTERASE UR QL STRIP: 100
LIPASE SERPL-CCNC: 49 U/L (ref 11–82)
LYMPHOCYTES # BLD AUTO: 1.77 THOUSANDS/ÂΜL (ref 0.6–4.47)
LYMPHOCYTES NFR BLD AUTO: 20 % (ref 14–44)
MCH RBC QN AUTO: 23.1 PG (ref 26.8–34.3)
MCHC RBC AUTO-ENTMCNC: 28.8 G/DL (ref 31.4–37.4)
MCV RBC AUTO: 80 FL (ref 82–98)
MONOCYTES # BLD AUTO: 0.5 THOUSAND/ÂΜL (ref 0.17–1.22)
MONOCYTES NFR BLD AUTO: 6 % (ref 4–12)
MUCOUS THREADS UR QL AUTO: ABNORMAL
NEUTROPHILS # BLD AUTO: 6.54 THOUSANDS/ÂΜL (ref 1.85–7.62)
NEUTS SEG NFR BLD AUTO: 73 % (ref 43–75)
NITRITE UR QL STRIP: NEGATIVE
NON-SQ EPI CELLS URNS QL MICRO: ABNORMAL /HPF
NRBC BLD AUTO-RTO: 0 /100 WBCS
PH UR STRIP.AUTO: 7 [PH]
PLATELET # BLD AUTO: 265 THOUSANDS/UL (ref 149–390)
POTASSIUM SERPL-SCNC: 4.4 MMOL/L (ref 3.5–5.3)
PROT SERPL-MCNC: 6.9 G/DL (ref 6.4–8.4)
PROT UR STRIP-MCNC: ABNORMAL MG/DL
RBC # BLD AUTO: 4.41 MILLION/UL (ref 3.81–5.12)
RBC #/AREA URNS AUTO: ABNORMAL /HPF
SODIUM SERPL-SCNC: 134 MMOL/L (ref 135–147)
SP GR UR STRIP.AUTO: 1.01 (ref 1–1.04)
UROBILINOGEN UA: NEGATIVE MG/DL
WBC # BLD AUTO: 8.93 THOUSAND/UL (ref 4.31–10.16)
WBC #/AREA URNS AUTO: ABNORMAL /HPF

## 2024-04-13 PROCEDURE — 83690 ASSAY OF LIPASE: CPT

## 2024-04-13 PROCEDURE — 96375 TX/PRO/DX INJ NEW DRUG ADDON: CPT

## 2024-04-13 PROCEDURE — 76705 ECHO EXAM OF ABDOMEN: CPT

## 2024-04-13 PROCEDURE — 85025 COMPLETE CBC W/AUTO DIFF WBC: CPT

## 2024-04-13 PROCEDURE — 96361 HYDRATE IV INFUSION ADD-ON: CPT

## 2024-04-13 PROCEDURE — 36415 COLL VENOUS BLD VENIPUNCTURE: CPT

## 2024-04-13 PROCEDURE — 99285 EMERGENCY DEPT VISIT HI MDM: CPT

## 2024-04-13 PROCEDURE — 96374 THER/PROPH/DIAG INJ IV PUSH: CPT

## 2024-04-13 PROCEDURE — 81001 URINALYSIS AUTO W/SCOPE: CPT

## 2024-04-13 PROCEDURE — 80053 COMPREHEN METABOLIC PANEL: CPT

## 2024-04-13 PROCEDURE — 99284 EMERGENCY DEPT VISIT MOD MDM: CPT

## 2024-04-13 RX ORDER — KETOROLAC TROMETHAMINE 30 MG/ML
15 INJECTION, SOLUTION INTRAMUSCULAR; INTRAVENOUS ONCE
Status: COMPLETED | OUTPATIENT
Start: 2024-04-13 | End: 2024-04-13

## 2024-04-13 RX ORDER — ONDANSETRON 2 MG/ML
4 INJECTION INTRAMUSCULAR; INTRAVENOUS ONCE
Status: COMPLETED | OUTPATIENT
Start: 2024-04-13 | End: 2024-04-13

## 2024-04-13 RX ORDER — ONDANSETRON 4 MG/1
4 TABLET, ORALLY DISINTEGRATING ORAL EVERY 6 HOURS PRN
Qty: 20 TABLET | Refills: 0 | Status: SHIPPED | OUTPATIENT
Start: 2024-04-13

## 2024-04-13 RX ORDER — NAPROXEN 500 MG/1
500 TABLET ORAL 2 TIMES DAILY WITH MEALS
Qty: 30 TABLET | Refills: 0 | Status: SHIPPED | OUTPATIENT
Start: 2024-04-13

## 2024-04-13 RX ADMIN — SODIUM CHLORIDE 1000 ML: 0.9 INJECTION, SOLUTION INTRAVENOUS at 07:05

## 2024-04-13 RX ADMIN — KETOROLAC TROMETHAMINE 15 MG: 30 INJECTION, SOLUTION INTRAMUSCULAR; INTRAVENOUS at 07:05

## 2024-04-13 RX ADMIN — ONDANSETRON 4 MG: 2 INJECTION INTRAMUSCULAR; INTRAVENOUS at 07:05

## 2024-04-13 NOTE — ED PROVIDER NOTES
History  Chief Complaint   Patient presents with    Abdominal Pain     RUQ pain with radiation to Rt upper back.  Sx onset at 1700.  Took tylenol at 0100, without improvement.  + nausea, - VD.       The patient is a 43-year-old female with a past medical history of GERD, H. pylori gastritis, and iron deficiency anemia, who presents for evaluation abdominal pain.  She reports right upper quadrant pain radiating into her back since 5 PM last night.  The pain began shortly after eating dinner and has been constant since that time.  Associated symptoms include nausea and worsening of her baseline reflux.  No diarrhea, constipation, dysuria, urinary frequency, hematuria, cold-like symptoms, or fevers.  She took Tylenol at 1 AM without relief.  The patient reports two similar episodes in the past and she is supposed to undergo an outpatient ultrasound for further evaluation.      History provided by:  Medical records and patient   used: Yes ([employer]  Jorge)        Prior to Admission Medications   Prescriptions Last Dose Informant Patient Reported? Taking?   diphenhydrAMINE (BENADRYL) 25 mg tablet   No No   Sig: Take 1 tablet (25 mg total) by mouth every 6 (six) hours as needed for itching   famotidine (PEPCID) 20 mg tablet   No No   Sig: Take 1 tablet (20 mg total) by mouth 2 (two) times a day   ferrous sulfate 324 (65 Fe) mg   No No   Sig: Take 1 tablet (324 mg total) by mouth 2 (two) times a day before meals   Patient not taking: Reported on 1/4/2024   naproxen (NAPROSYN) 375 mg tablet   No No   Sig: Take 1 tablet (375 mg total) by mouth 2 (two) times a day with meals   Patient not taking: Reported on 1/4/2024   pantoprazole (PROTONIX) 40 mg tablet   No No   Sig: TAKE 1 TABLET BY MOUTH EVERY DAY      Facility-Administered Medications: None       Past Medical History:   Diagnosis Date    Anemia     Dental disease     Yeast infection 08/25/2022       Past Surgical History:   Procedure  Laterality Date    TUBAL LIGATION         Family History   Problem Relation Age of Onset    Diabetes Mother     Diabetes Father     Colon cancer Neg Hx     Colon polyps Neg Hx     Stomach cancer Neg Hx      I have reviewed and agree with the history as documented.    E-Cigarette/Vaping    E-Cigarette Use Never User      E-Cigarette/Vaping Substances     Social History     Tobacco Use    Smoking status: Never    Smokeless tobacco: Never   Vaping Use    Vaping status: Never Used   Substance Use Topics    Alcohol use: Not Currently    Drug use: Never       Review of Systems   Constitutional:  Negative for chills and fever.   HENT:  Negative for ear pain and sore throat.    Eyes:  Negative for pain and visual disturbance.   Respiratory:  Negative for cough and shortness of breath.    Cardiovascular:  Negative for chest pain and palpitations.   Gastrointestinal:  Positive for abdominal pain and nausea. Negative for constipation, diarrhea and vomiting.        + Pyrosis   Genitourinary:  Negative for dysuria and hematuria.   Musculoskeletal:  Negative for arthralgias and back pain.   Skin:  Negative for color change and rash.   Neurological:  Negative for seizures and syncope.   All other systems reviewed and are negative.      Physical Exam  Physical Exam  Vitals and nursing note reviewed.   Constitutional:       General: She is awake.      Appearance: She is well-developed. She is obese. She is not toxic-appearing or diaphoretic.   HENT:      Head: Normocephalic and atraumatic.      Right Ear: External ear normal.      Left Ear: External ear normal.      Nose: Nose normal.   Eyes:      General: Lids are normal. Vision grossly intact. Gaze aligned appropriately. No scleral icterus.     Conjunctiva/sclera: Conjunctivae normal.      Pupils: Pupils are equal, round, and reactive to light.   Cardiovascular:      Rate and Rhythm: Normal rate and regular rhythm.      Heart sounds: S1 normal and S2 normal. No murmur heard.      No friction rub. No gallop.   Pulmonary:      Effort: Pulmonary effort is normal. No respiratory distress.      Breath sounds: Normal breath sounds and air entry. No wheezing, rhonchi or rales.   Abdominal:      General: There is no distension.      Palpations: There is no mass.      Tenderness: There is abdominal tenderness in the right upper quadrant. There is guarding and rebound. There is no right CVA tenderness or left CVA tenderness. Positive signs include Lamar's sign. Negative signs include McBurney's sign.      Hernia: No hernia is present.   Musculoskeletal:      Cervical back: Normal, full passive range of motion without pain and neck supple. No rigidity or crepitus. No spinous process tenderness or muscular tenderness.      Thoracic back: Tenderness present. No spasms or bony tenderness.      Lumbar back: Normal. No tenderness or bony tenderness.        Back:    Skin:     General: Skin is warm.      Capillary Refill: Capillary refill takes less than 2 seconds.      Coloration: Skin is not jaundiced or pale.      Findings: No rash.   Neurological:      Mental Status: She is alert and oriented to person, place, and time.   Psychiatric:         Attention and Perception: Attention normal.         Speech: Speech normal.         Behavior: Behavior is cooperative.         Vital Signs  ED Triage Vitals [04/13/24 0619]   Temperature Pulse Respirations Blood Pressure SpO2   (!) 97.2 °F (36.2 °C) 81 16 143/84 99 %      Temp Source Heart Rate Source Patient Position - Orthostatic VS BP Location FiO2 (%)   Tympanic -- -- -- --      Pain Score       --           Vitals:    04/13/24 0619   BP: 143/84   Pulse: 81         ED Medications  Medications   sodium chloride 0.9 % bolus 1,000 mL (0 mL Intravenous Stopped 4/13/24 0904)   ketorolac (TORADOL) injection 15 mg (15 mg Intravenous Given 4/13/24 0705)   ondansetron (ZOFRAN) injection 4 mg (4 mg Intravenous Given 4/13/24 0705)       Diagnostic Studies  Results Reviewed        Procedure Component Value Units Date/Time    Urine Microscopic [[id number]]  (Abnormal) Collected: 04/13/24 0759    Lab Status: Final result Specimen: Urine, Other Updated: 04/13/24 0826     RBC, UA 1-2 /hpf      WBC, UA 2-4 /hpf      Epithelial Cells Moderate /hpf      Bacteria, UA Moderate /hpf      AMORPH PHOSPATES Moderate /hpf      MUCUS THREADS Occasional    UA (URINE) with reflex to Scope [[id number]]  (Abnormal) Collected: 04/13/24 0759    Lab Status: Final result Specimen: Urine, Other Updated: 04/13/24 0816     Color, UA Straw     Clarity, UA Clear     Specific Gravity, UA 1.010     pH, UA 7.0     Leukocytes, .0     Nitrite, UA Negative     Protein, UA 15 (Trace) mg/dl      Glucose, UA Negative mg/dl      Ketones, UA Negative mg/dl      Bilirubin, UA Negative     Occult Blood, UA Negative     UROBILINOGEN UA Negative mg/dL     Comprehensive metabolic panel [[id number]]  (Abnormal) Collected: 04/13/24 0705    Lab Status: Final result Specimen: Blood from Arm, Right Updated: 04/13/24 0735     Sodium 134 mmol/L      Potassium 4.4 mmol/L      Chloride 103 mmol/L      CO2 22 mmol/L      ANION GAP 9 mmol/L      BUN 14 mg/dL      Creatinine 0.66 mg/dL      Glucose 117 mg/dL      Calcium 9.0 mg/dL      AST 29 U/L      ALT 12 U/L      Alkaline Phosphatase 72 U/L      Total Protein 6.9 g/dL      Albumin 3.9 g/dL      Total Bilirubin 0.34 mg/dL      eGFR 110 ml/min/1.73sq m     Narrative:      National Kidney Disease Foundation guidelines for Chronic Kidney Disease (CKD):     Stage 1 with normal or high GFR (GFR > 90 mL/min/1.73 square meters)    Stage 2 Mild CKD (GFR = 60-89 mL/min/1.73 square meters)    Stage 3A Moderate CKD (GFR = 45-59 mL/min/1.73 square meters)    Stage 3B Moderate CKD (GFR = 30-44 mL/min/1.73 square meters)    Stage 4 Severe CKD (GFR = 15-29 mL/min/1.73 square meters)    Stage 5 End Stage CKD (GFR <15 mL/min/1.73 square meters)  Note: GFR calculation is accurate only with a steady state  creatinine    Lipase [[id number]]  (Normal) Collected: 04/13/24 0705    Lab Status: Final result Specimen: Blood from Arm, Right Updated: 04/13/24 0735     Lipase 49 u/L     CBC and differential [[id number]]  (Abnormal) Collected: 04/13/24 0705    Lab Status: Final result Specimen: Blood from Arm, Right Updated: 04/13/24 0711     WBC 8.93 Thousand/uL      RBC 4.41 Million/uL      Hemoglobin 10.2 g/dL      Hematocrit 35.4 %      MCV 80 fL      MCH 23.1 pg      MCHC 28.8 g/dL      Platelets 265 Thousands/uL      nRBC 0 /100 WBCs      Segmented % 73 %      Immature Grans % 0 %      Lymphocytes % 20 %      Monocytes % 6 %      Eosinophils Relative 0 %      Basophils Relative 1 %      Absolute Neutrophils 6.54 Thousands/µL      Absolute Immature Grans 0.03 Thousand/uL      Absolute Lymphocytes 1.77 Thousands/µL      Absolute Monocytes 0.50 Thousand/µL      Eosinophils Absolute 0.04 Thousand/µL      Basophils Absolute 0.05 Thousands/µL                    US right upper quadrant   Final Result by Jewel Chua MD (04/13 0837)      Cholelithiasis. Gallbladder cholesterolosis.      No gallbladder wall thickening, pericholecystic fluid, sonographic Lamar's sign, or biliary dilatation.      Workstation performed: [id number]                    Procedures  Procedures         ED Course  ED Course as of 04/13/24 0911   Sat Apr 13, 2024   0719 Hemoglobin(!): 10.2  History of anemia   0719 MCV(!): 80   0739 LIPASE: 49   0739 AST: 29   0740 ALT: 12   0740 ALK PHOS: 72   0740 Total Bilirubin: 0.34           Medical Decision Making  Patient presents for RUQ abdominal pain radiating into her back.  Differential diagnosis includes but is not limited to GERD, PUD, gastritis, cholelithiasis, biliary colic, choledocholithiasis, hepatitis, pancreatitis, or food poisoning.  Labs unremarkable, therefore doubt cholecystitis, however will obtain ultrasound for further evaluation.  Right upper quadrant ultrasound confirmed cholelithiasis, but  showed no evidence of acute cholecystitis or choledocholithiasis.  Will refer to outpatient general surgery for definitive management if patient so chooses.  Reviewed all results and her questions were answered to the best my ability.  Strict return precautions discussed verbalized understanding.  Follow-up PCP and/or general surgery, but return to the ED in the interim with new or worsening symptoms.    Problems Addressed:  Cholelithiasis: acute illness or injury  Right upper quadrant abdominal pain: acute illness or injury    Amount and/or Complexity of Data Reviewed  Labs: ordered. Decision-making details documented in ED Course.  Radiology: ordered.    Risk  Prescription drug management.             Disposition  Final diagnoses:   Right upper quadrant abdominal pain   Cholelithiasis     Time reflects when diagnosis was documented in both MDM as applicable and the Disposition within this note       Time User Action Codes Description Comment    4/13/2024  7:55 AM Latricia Wolf [R10.11] Right upper quadrant abdominal pain     4/13/2024  8:55 AM Latricia Wolf [K80.20] Cholelithiasis           ED Disposition       ED Disposition   Discharge    Condition   Stable    Date/Time   Sat Apr 13, 2024  9:10 AM    Comment   Delia Sarahi MurilloReyes discharge to home/self care.                   Follow-up Information       Follow up With Specialties Details Why Contact Info Additional Information    [facility] Medicine   [address] [place] [address], [address] [address] [phone] [facility], [address] [place] [address], [address], [address] [phone]    [facility] General Surgery [address] [place] [address] General Surgery   [address] [place] [address] [phone] [facility] General Surgery [address] [place] [address], [address]    [phone]            Patient's Medications   Discharge Prescriptions    NAPROXEN (NAPROSYN) 500 MG TABLET    Take 1 tablet (500 mg total) by mouth 2 (two) times a day with meals       Start Date: 4/13/2024 End Date: --       Order Dose: 500 mg       Quantity: 30 tablet    Refills: 0    ONDANSETRON (ZOFRAN-ODT) 4 MG DISINTEGRATING TABLET    Take 1 tablet (4 mg total) by mouth every 6 (six) hours as needed for nausea or vomiting       Start Date: 4/13/2024 End Date: --       Order Dose: 4 mg       Quantity: 20 tablet    Refills: 0           PDMP Review       None            ED Provider  Electronically Signed by             Latricia Wolf PA-C  04/13/24 0911

## 2024-04-13 NOTE — ED CARE HANDOFF
Emergency Department Sign Out Note        Sign out and transfer of care from Latricia Wolf PA-C. See Separate Emergency Department note.     The patient, Delia Sarahi MurilloReyes, was evaluated by the previous provider for RUQ abdominal pain.    Workup Completed:  H&P, labs with analysis, imaging    ED Course / Workup Pending (followup):  Disposition pending imaging results                                     Procedures  Medical Decision Making  Amount and/or Complexity of Data Reviewed  Labs: ordered.  Radiology: ordered.    Risk  Prescription drug management.            Disposition  Final diagnoses:   Right upper quadrant abdominal pain     Time reflects when diagnosis was documented in both MDM as applicable and the Disposition within this note       Time User Action Codes Description Comment    4/13/2024  7:55 AM Latricia Wolf Add [R10.11] Right upper quadrant abdominal pain           ED Disposition       None          Follow-up Information       Follow up With Specialties Details Why Contact Info Additional Information    [facility] Medicine   [address] [place] [address], [address] [address] [phone] [facility], [address] [place] [address], [address], [address] [phone]    [facility] General Surgery [address] [place] [address] General Surgery   [address] [place] [address] [phone] [facility] Surgery [address] [place] [address], [address] [phone]          Patient's Medications   Discharge Prescriptions    No medications on file     No discharge procedures on file.       ED Provider  Electronically Signed by     Radha Soares PA-C  04/13/24 0821

## 2024-04-15 ENCOUNTER — CONSULT (OUTPATIENT)
Dept: GASTROENTEROLOGY | Facility: CLINIC | Age: 44
End: 2024-04-15

## 2024-04-15 ENCOUNTER — APPOINTMENT (OUTPATIENT)
Dept: LAB | Facility: HOSPITAL | Age: 44
End: 2024-04-15

## 2024-04-15 VITALS
DIASTOLIC BLOOD PRESSURE: 80 MMHG | BODY MASS INDEX: 35.51 KG/M2 | WEIGHT: 200.4 LBS | HEIGHT: 63 IN | TEMPERATURE: 98.3 F | SYSTOLIC BLOOD PRESSURE: 129 MMHG | HEART RATE: 84 BPM

## 2024-04-15 DIAGNOSIS — D50.9 IRON DEFICIENCY ANEMIA, UNSPECIFIED IRON DEFICIENCY ANEMIA TYPE: ICD-10-CM

## 2024-04-15 DIAGNOSIS — R10.13 EPIGASTRIC PAIN: ICD-10-CM

## 2024-04-15 DIAGNOSIS — K59.09 OTHER CONSTIPATION: ICD-10-CM

## 2024-04-15 DIAGNOSIS — K59.09 OTHER CONSTIPATION: Primary | ICD-10-CM

## 2024-04-15 LAB
FERRITIN SERPL-MCNC: 15 NG/ML (ref 11–307)
IRON SATN MFR SERPL: 57 % (ref 15–50)
IRON SERPL-MCNC: 214 UG/DL (ref 50–212)
TIBC SERPL-MCNC: 377 UG/DL (ref 250–450)
TSH SERPL DL<=0.05 MIU/L-ACNC: 1.78 UIU/ML (ref 0.45–4.5)
UIBC SERPL-MCNC: 163 UG/DL (ref 155–355)

## 2024-04-15 PROCEDURE — 99214 OFFICE O/P EST MOD 30 MIN: CPT | Performed by: INTERNAL MEDICINE

## 2024-04-15 PROCEDURE — 86231 EMA EACH IG CLASS: CPT

## 2024-04-15 PROCEDURE — 84443 ASSAY THYROID STIM HORMONE: CPT

## 2024-04-15 PROCEDURE — 83540 ASSAY OF IRON: CPT

## 2024-04-15 PROCEDURE — 82784 ASSAY IGA/IGD/IGG/IGM EACH: CPT

## 2024-04-15 PROCEDURE — 86258 DGP ANTIBODY EACH IG CLASS: CPT

## 2024-04-15 PROCEDURE — 36415 COLL VENOUS BLD VENIPUNCTURE: CPT

## 2024-04-15 PROCEDURE — 86364 TISS TRNSGLTMNASE EA IG CLAS: CPT

## 2024-04-15 PROCEDURE — 83550 IRON BINDING TEST: CPT

## 2024-04-15 PROCEDURE — 82728 ASSAY OF FERRITIN: CPT

## 2024-04-15 RX ORDER — OMEPRAZOLE 40 MG/1
40 CAPSULE, DELAYED RELEASE ORAL DAILY
Qty: 90 CAPSULE | Refills: 2 | Status: SHIPPED | OUTPATIENT
Start: 2024-04-15

## 2024-04-15 RX ORDER — POLYETHYLENE GLYCOL 3350 17 G/17G
17 POWDER, FOR SOLUTION ORAL DAILY
Qty: 20 EACH | Refills: 1 | Status: SHIPPED | OUTPATIENT
Start: 2024-04-15

## 2024-04-15 NOTE — PROGRESS NOTES
[facility] - Outpatient Follow-up Note  Delia Sarahi MurilloReyes 43 y.o. female MRN: [id number]  Encounter: [id number]          ASSESSMENT AND PLAN:    42 yo Prydeinig speaking F with history of abnormal uterine bleeding, MEMO, epigastric pain, constipation and class II obesity who presents for follow up regarding epigastric pain, constipation and MEMO.    Epigastric pain  Iron deficiency anemia   Constipation   - switch pantoprazole 40 mg po daily to omeprazole 40 mg po daily  - GERD lifestyle changes discussed, including avoidance of trigger foods (potential foods include coffee, caffeine, chocolate, mint, tomato-based products, spicy foods, fatty foods), avoid tight fitting clothing, elevate head of bed 30 degrees, avoid eating 2-3 hours prior to bedtime, weight loss, avoid alcohol, avoid tobacco use.   - increase water intake  - trial Miralax, increase dietary fiber  - continue to monitor blood counts  - check iron panel, Celiac serologies and TSH  - reduce NSAID use, taking Tylenol instead  - agree with surgery consultation   ______________________________________________________________________    SUBJECTIVE:  42 yo Prydeinig speaking F with history of abnormal uterine bleeding, MEMO, epigastric pain, constipation and class II obesity who presents for follow up regarding epigastric pain and MEMO. She continues to have epigastric pain every day with burning, reflux. She has stopped multiple foods that were making it worse, but symptoms continue. She is on po iron supplementation. She continues on pantoprazole 40 mg po daily, taking it on an empty stomach soon before breakfast. She takes Advil for pain twice a week for arm pain. She continues with constipation, but has a BM daily once she takes a probiotic. When she does not she has gas and lower abd pain. She recently presented to the ED on 4/13 due to RUQ pain with nausea with worsening of reflux. Most recent Hb 10.2 on 4/13. HCV ab and  "HBsAg not detected. H pylori stool ag negative on 3/12. Normal celiac serologies in 2023. RUQ US on 4/13/24 showing cholelithiasis. She is going to make an appt with surgery. Denies fevers, nausea, vomiting, weight loss, dysphagia, odynophagia, melena, hematochezia.     EGD/colonoscopy 1/29/2023 showing small hiatal hernia and a single sub-cm sessile polyp with bx showing duodenal mucosa with patchy increased intraepithelial lymphocytes with normal gastric bx    History obtained with assistance of [employer] interpretor [id number]      REVIEW OF SYSTEMS IS OTHERWISE NEGATIVE.      Historical Information   Past Medical History:   Diagnosis Date    Anemia     Dental disease     Yeast infection 08/25/2022     Past Surgical History:   Procedure Laterality Date    COLONOSCOPY      TUBAL LIGATION      UPPER GASTROINTESTINAL ENDOSCOPY       Social History   Social History     Substance and Sexual Activity   Alcohol Use Not Currently     Social History     Substance and Sexual Activity   Drug Use Never     Social History     Tobacco Use   Smoking Status Never   Smokeless Tobacco Never     Family History   Problem Relation Age of Onset    Diabetes Mother     Diabetes Father     Colon cancer Neg Hx     Colon polyps Neg Hx     Stomach cancer Neg Hx        Meds/Allergies       Current Outpatient Medications:     diphenhydrAMINE (BENADRYL) 25 mg tablet    famotidine (PEPCID) 20 mg tablet    ferrous sulfate 324 (65 Fe) mg    naproxen (Naprosyn) 500 mg tablet    naproxen (NAPROSYN) 375 mg tablet    ondansetron (ZOFRAN-ODT) 4 mg disintegrating tablet    pantoprazole (PROTONIX) 40 mg tablet    No Active Allergies          Objective     Blood pressure 129/80, pulse 84, temperature 98.3 °F (36.8 °C), temperature source Tympanic, height 5' 3\" (1.6 m), weight 90.9 kg (200 lb 6.4 oz), last menstrual period 03/15/2024, not currently breastfeeding. Body mass index is 35.5 kg/m².      PHYSICAL EXAM:      General Appearance:   Alert, cooperative, " no distress, obese   HEENT:   Normocephalic, atraumatic, anicteric.     Neck:  Supple, symmetrical, trachea midline   Lungs:   Respirations unlabored    Heart::   Regular rate    Abdomen:   Soft, non-tender, non-distended; normal bowel sounds; no masses, no organomegaly    Genitalia:   Deferred    Rectal:   Deferred    Extremities:  No cyanosis, clubbing or edema    Pulses:  Symmetric    Skin:  No jaundice, rashes, or lesions    Lymph nodes:  No palpable cervical lymphadenopathy        Lab Results:   No visits with results within 1 Day(s) from this visit.   Latest known visit with results is:   Admission on 04/13/2024, Discharged on 04/13/2024   Component Date Value    WBC 04/13/2024 8.93     RBC 04/13/2024 4.41     Hemoglobin 04/13/2024 10.2 (L)     Hematocrit 04/13/2024 35.4     MCV 04/13/2024 80 (L)     MCH 04/13/2024 23.1 (L)     MCHC 04/13/2024 28.8 (L)     Platelets 04/13/2024 265     nRBC 04/13/2024 0     Segmented % 04/13/2024 73     Immature Grans % 04/13/2024 0     Lymphocytes % 04/13/2024 20     Monocytes % 04/13/2024 6     Eosinophils Relative 04/13/2024 0     Basophils Relative 04/13/2024 1     Absolute Neutrophils 04/13/2024 6.54     Absolute Immature Grans 04/13/2024 0.03     Absolute Lymphocytes 04/13/2024 1.77     Absolute Monocytes 04/13/2024 0.50     Eosinophils Absolute 04/13/2024 0.04     Basophils Absolute 04/13/2024 0.05     Sodium 04/13/2024 134 (L)     Potassium 04/13/2024 4.4     Chloride 04/13/2024 103     CO2 04/13/2024 22     ANION GAP 04/13/2024 9     BUN 04/13/2024 14     Creatinine 04/13/2024 0.66     Glucose 04/13/2024 117     Calcium 04/13/2024 9.0     AST 04/13/2024 29     ALT 04/13/2024 12     Alkaline Phosphatase 04/13/2024 72     Total Protein 04/13/2024 6.9     Albumin 04/13/2024 3.9     Total Bilirubin 04/13/2024 0.34     eGFR 04/13/2024 110     Lipase 04/13/2024 49     Color, UA 04/13/2024 Straw     Clarity, UA 04/13/2024 Clear     Specific Gravity, UA 04/13/2024 1.010      "pH, UA 04/13/2024 7.0     Leukocytes, UA 04/13/2024 100.0 (A)     Nitrite, UA 04/13/2024 Negative     Protein, UA 04/13/2024 15 (Trace) (A)     Glucose, UA 04/13/2024 Negative     Ketones, UA 04/13/2024 Negative     Bilirubin, UA 04/13/2024 Negative     Occult Blood, UA 04/13/2024 Negative     UROBILINOGEN UA 04/13/2024 Negative     RBC, UA 04/13/2024 1-2     WBC, UA 04/13/2024 2-4     Epithelial Cells 04/13/2024 Moderate (A)     Bacteria, UA 04/13/2024 Moderate (A)     AMORPH PHOSPATES 04/13/2024 Moderate     MUCUS THREADS 04/13/2024 Occasional (A)          Radiology Results:   US right upper quadrant    Result Date: 4/13/2024  Narrative: RIGHT UPPER QUADRANT ULTRASOUND INDICATION: Right upper quadrant pain; evaluate for gallstones. COMPARISON: None TECHNIQUE: Real-time ultrasound of the right upper quadrant was performed with a curvilinear transducer with both volumetric sweeps and still imaging techniques. FINDINGS: PANCREAS: Visualized portions of the pancreas are within normal limits. AORTA AND IVC: Visualized portions are normal for patient age. LIVER: Size: Within normal range. The liver measures 14.8 cm in the midclavicular line. Contour: Surface contour is smooth. Parenchyma: Echogenicity and echotexture are within normal limits. No liver mass identified. Limited imaging of the main portal vein shows it to be patent and hepatopetal. BILIARY: Shadowing gallstones. Tiny foci of \"ringdown artifact\" along the gallbladder wall suggestive of cholesterolosis. The gallbladder is normal in caliber. No wall thickening or pericholecystic fluid. No sonographic Lamar's sign. No intrahepatic biliary dilatation. CBD measures 3.0 mm. No choledocholithiasis. KIDNEY: Right kidney measures 11.1 x 4.4 x 6.5 cm. Volume 166.9 mL Kidney within normal limits. ASCITES: None.     Impression: Cholelithiasis. Gallbladder cholesterolosis. No gallbladder wall thickening, pericholecystic fluid, sonographic Lamar's sign, or biliary " dilatation. Workstation performed: [id number]         Jonn Cabrera  GI Fellow

## 2024-04-17 LAB
ENDOMYSIUM IGA SER QL: NEGATIVE
GLIADIN PEPTIDE IGA SER-ACNC: 5 UNITS (ref 0–19)
GLIADIN PEPTIDE IGG SER-ACNC: 3 UNITS (ref 0–19)
IGA SERPL-MCNC: 248 MG/DL (ref 87–352)
TTG IGA SER-ACNC: <2 U/ML (ref 0–3)
TTG IGG SER-ACNC: 5 U/ML (ref 0–5)

## 2024-04-19 ENCOUNTER — TELEPHONE (OUTPATIENT)
Dept: GASTROENTEROLOGY | Facility: CLINIC | Age: 44
End: 2024-04-19

## 2024-04-19 NOTE — TELEPHONE ENCOUNTER
----- Message from Jonn Cabrera MD sent at 4/19/2024  1:45 PM EDT -----  Attempted to call pt to inform her of normal lab results, but person answering said it was the wrong number. Could we just try to call her one more time to let her know that the lab tests we did from her recent office visit were all normal. She does not have Celiac disease, thyroid abnormalities, or iron deficiency. Thanks, Jonn.

## 2024-04-19 NOTE — TELEPHONE ENCOUNTER
I was able to speak with pt with [employer] infante Id# [id number]  and relay results  Given from provider, pt has understood results given from provider.

## 2024-05-08 ENCOUNTER — TELEPHONE (OUTPATIENT)
Age: 44
End: 2024-05-08

## 2024-05-08 NOTE — TELEPHONE ENCOUNTER
Via  line: Pt states she could not find address for her appt today; rescheduled as Dr. Tierney has back to back pts and surgery today; per [address] office, I rescheduled pt w/ Dr. Fox for 5/28. Provided pt w/ address and told her to sign up for [employer] so she can better access appt information; sent pt an email invite to [employer].

## 2024-07-24 ENCOUNTER — ANNUAL EXAM (OUTPATIENT)
Dept: OBGYN CLINIC | Facility: CLINIC | Age: 44
End: 2024-07-24

## 2024-07-24 VITALS
SYSTOLIC BLOOD PRESSURE: 107 MMHG | WEIGHT: 205 LBS | HEIGHT: 63 IN | HEART RATE: 76 BPM | DIASTOLIC BLOOD PRESSURE: 73 MMHG | BODY MASS INDEX: 36.32 KG/M2

## 2024-07-24 DIAGNOSIS — Z01.419 ENCOUNTER FOR ANNUAL ROUTINE GYNECOLOGICAL EXAMINATION: Primary | ICD-10-CM

## 2024-07-24 DIAGNOSIS — Z12.31 ENCOUNTER FOR SCREENING MAMMOGRAM FOR MALIGNANT NEOPLASM OF BREAST: ICD-10-CM

## 2024-07-24 DIAGNOSIS — Z71.85 HPV VACCINE COUNSELING: ICD-10-CM

## 2024-07-24 PROCEDURE — 99396 PREV VISIT EST AGE 40-64: CPT | Performed by: NURSE PRACTITIONER

## 2024-07-24 PROCEDURE — 90651 9VHPV VACCINE 2/3 DOSE IM: CPT | Performed by: NURSE PRACTITIONER

## 2024-07-24 PROCEDURE — 90471 IMMUNIZATION ADMIN: CPT | Performed by: NURSE PRACTITIONER

## 2024-07-24 NOTE — PATIENT INSTRUCTIONS
Return in 2 and 6 months for the HPV vaccines  Schedule mammogram  Call with needs or concerns  Return in 1 year

## 2024-07-24 NOTE — PROGRESS NOTES
Annual Exam    Assessment   1. Encounter for annual routine gynecological examination        2. Encounter for screening mammogram for malignant neoplasm of breast  Mammo screening bilateral w 3d & cad      3. HPV vaccine counseling  HPV Vaccine 9 valent IM        well woman       Plan       All questions answered.  Breast self exam technique reviewed and patient encouraged to perform self-exam monthly.  Contraception: tubal ligation.  Discussed healthy lifestyle modifications.  Follow up in 1 year.  Mammogram.     Patient Instructions   Return in 2 and 6 months for the HPV vaccines  Schedule mammogram  Call with needs or concerns  Return in 1 year  Pt verbalized understanding of all discussed.      Subjective      Delia Sarahi MurilloReyes is a 43 y.o.  female who presents for annual well woman exam. Periods are regular every 28-30 days, lasting 4 days. No intermenstrual bleeding, spotting, or discharge.  2022 last WNL PAP and negative HPV  No current partner, denies being sexually active  Pt states she has never had HPV vaccines, would like to start series today, discussed benefit of vaccine    Depression Screening Follow-up Plan: Patient's depression screening was negative with a PHQ-2 score of 0. Their PHQ-9 score was 3. Clinically patient does not have depression. No treatment is required.    BMI Counseling: Body mass index is 36.31 kg/m². The BMI is above normal. Nutrition recommendations include reducing portion sizes, decreasing overall calorie intake, 3-5 servings of fruits/vegetables daily, consuming healthier snacks, moderation in carbohydrate intake, and increasing intake of lean protein. Exercise recommendations include moderate aerobic physical activity for 150 minutes/week.         Current contraception: tubal ligation  History of abnormal Pap smear: no  Family history of uterine or ovarian cancer: no  Regular self breast exam: yes  History of abnormal mammogram: First ordered today  Family  "history of breast cancer: no  History of abnormal lipids: unknown  Menstrual History:  OB History          3    Para   3    Term                AB        Living   3         SAB        IAB        Ectopic        Multiple        Live Births   3                Menarche age: 13  Patient's last menstrual period was 2024.       The following portions of the patient's history were reviewed and updated as appropriate: allergies, current medications, past family history, past medical history, past social history, past surgical history and problem list.    Review of Systems  Pertinent items are noted in HPI.      Objective      /73 (BP Location: Left arm, Patient Position: Sitting, Cuff Size: Large)   Pulse 76   Ht 5' 3\" (1.6 m)   Wt 93 kg (205 lb)   LMP 2024   BMI 36.31 kg/m²     General: alert and oriented, in no acute distress, alert, appears stated age, and cooperative   Heart: NSR   Lungs: clear to auscultation bilaterally, WNL respiratory effort, negative cough or SOB   Thyroid: Negative masses palpable   Abdomen: soft, non-tender, without masses or organomegaly palpable   Vulva: normal   Vagina: normal mucosa   Cervix: no cervical motion tenderness and no lesions   Uterus: normal size, non-tender, normal shape and consistency   Adnexa: normal adnexa   Urethra: normal   Breasts: NT,negative masses palpable, negative discharge, or dimpling             "

## 2024-08-20 DIAGNOSIS — R10.13 EPIGASTRIC PAIN: ICD-10-CM

## 2024-08-20 RX ORDER — PANTOPRAZOLE SODIUM 40 MG/1
40 TABLET, DELAYED RELEASE ORAL DAILY
Qty: 90 TABLET | Refills: 3 | OUTPATIENT
Start: 2024-08-20

## 2024-09-16 DIAGNOSIS — R10.13 EPIGASTRIC PAIN: ICD-10-CM

## 2024-09-16 RX ORDER — PANTOPRAZOLE SODIUM 40 MG/1
40 TABLET, DELAYED RELEASE ORAL DAILY
Qty: 90 TABLET | Refills: 0 | Status: SHIPPED | OUTPATIENT
Start: 2024-09-16 | End: 2024-09-24 | Stop reason: SDUPTHER

## 2024-09-24 ENCOUNTER — OFFICE VISIT (OUTPATIENT)
Dept: FAMILY MEDICINE CLINIC | Facility: CLINIC | Age: 44
End: 2024-09-24

## 2024-09-24 ENCOUNTER — CLINICAL SUPPORT (OUTPATIENT)
Dept: OBGYN CLINIC | Facility: CLINIC | Age: 44
End: 2024-09-24

## 2024-09-24 VITALS
DIASTOLIC BLOOD PRESSURE: 74 MMHG | WEIGHT: 216.6 LBS | BODY MASS INDEX: 38.38 KG/M2 | HEIGHT: 63 IN | HEART RATE: 75 BPM | SYSTOLIC BLOOD PRESSURE: 109 MMHG

## 2024-09-24 VITALS
BODY MASS INDEX: 38.45 KG/M2 | HEIGHT: 63 IN | HEART RATE: 79 BPM | OXYGEN SATURATION: 99 % | WEIGHT: 217 LBS | RESPIRATION RATE: 20 BRPM | DIASTOLIC BLOOD PRESSURE: 84 MMHG | TEMPERATURE: 97.6 F | SYSTOLIC BLOOD PRESSURE: 112 MMHG

## 2024-09-24 DIAGNOSIS — F41.9 ANXIETY: ICD-10-CM

## 2024-09-24 DIAGNOSIS — Z01.00 ENCOUNTER FOR VISION SCREENING: ICD-10-CM

## 2024-09-24 DIAGNOSIS — K59.00 CONSTIPATION, UNSPECIFIED CONSTIPATION TYPE: ICD-10-CM

## 2024-09-24 DIAGNOSIS — R10.13 EPIGASTRIC PAIN: Primary | ICD-10-CM

## 2024-09-24 DIAGNOSIS — D50.9 IRON DEFICIENCY ANEMIA, UNSPECIFIED IRON DEFICIENCY ANEMIA TYPE: ICD-10-CM

## 2024-09-24 DIAGNOSIS — Z23 NEED FOR HPV VACCINE: Primary | ICD-10-CM

## 2024-09-24 DIAGNOSIS — K08.9 EXTRACTION OF TOOTH NEEDED: ICD-10-CM

## 2024-09-24 DIAGNOSIS — Z23 ENCOUNTER FOR IMMUNIZATION: ICD-10-CM

## 2024-09-24 PROCEDURE — 99214 OFFICE O/P EST MOD 30 MIN: CPT | Performed by: FAMILY MEDICINE

## 2024-09-24 PROCEDURE — 90471 IMMUNIZATION ADMIN: CPT

## 2024-09-24 PROCEDURE — 90651 9VHPV VACCINE 2/3 DOSE IM: CPT

## 2024-09-24 PROCEDURE — 90656 IIV3 VACC NO PRSV 0.5 ML IM: CPT | Performed by: FAMILY MEDICINE

## 2024-09-24 PROCEDURE — 90471 IMMUNIZATION ADMIN: CPT | Performed by: FAMILY MEDICINE

## 2024-09-24 RX ORDER — PANTOPRAZOLE SODIUM 40 MG/1
40 TABLET, DELAYED RELEASE ORAL DAILY
Qty: 90 TABLET | Refills: 1 | Status: SHIPPED | OUTPATIENT
Start: 2024-09-24

## 2024-09-24 RX ORDER — SENNOSIDES A AND B 8.6 MG/1
1 TABLET, FILM COATED ORAL DAILY
Qty: 90 TABLET | Refills: 1 | Status: SHIPPED | OUTPATIENT
Start: 2024-09-24

## 2024-09-24 RX ORDER — FERROUS SULFATE 324(65)MG
324 TABLET, DELAYED RELEASE (ENTERIC COATED) ORAL
Qty: 90 TABLET | Refills: 1 | Status: SHIPPED | OUTPATIENT
Start: 2024-09-24

## 2024-09-24 NOTE — PROGRESS NOTES
HPV given to patient in right deltoid on 9/24/2024.    NDC: 3805-7104-52  LOT: [id number]  EXP: 05/15/2026

## 2024-09-24 NOTE — ASSESSMENT & PLAN NOTE
Takes ferrous sulfate 2 times daily  History of iron deficiency anemia due to chronic blood loss  Orders:    ferrous sulfate 324 (65 Fe) mg; Take 1 tablet (324 mg total) by mouth 2 (two) times a day before meals

## 2024-09-24 NOTE — ASSESSMENT & PLAN NOTE
H/o Gerd  Needs refill of Pantoprazole  Orders:    pantoprazole (PROTONIX) 40 mg tablet; Take 1 tablet (40 mg total) by mouth daily

## 2024-09-24 NOTE — PROGRESS NOTES
Ambulatory Visit  Name: Delia Sarahi MurilloReyes      : 1980      MRN: [id number]  Encounter Provider: Yanira Morfin MD  Encounter Date: 2024   Encounter department: [facility]    Assessment & Plan  Epigastric pain  H/o Gerd  Needs refill of Pantoprazole  Orders:    pantoprazole (PROTONIX) 40 mg tablet; Take 1 tablet (40 mg total) by mouth daily    Iron deficiency anemia, unspecified iron deficiency anemia type  Takes ferrous sulfate 2 times daily  History of iron deficiency anemia due to chronic blood loss  Orders:    ferrous sulfate 324 (65 Fe) mg; Take 1 tablet (324 mg total) by mouth 2 (two) times a day before meals    Constipation, unspecified constipation type    Orders:    senna (SENOKOT) 8.6 MG tablet; Take 1 tablet (8.6 mg total) by mouth daily    Extraction of tooth needed    Orders:    Ambulatory Referral to Oral Maxillofacial Surgery; Future    Encounter for vision screening    Orders:    Ambulatory Referral to Ophthalmology; Future    Anxiety  Pt reported h/o Anxiety  Requested referral to Psych services  Referred to [facility]  Orders:    Ambulatory referral to Psych Services; Future    Encounter for immunization    Orders:    influenza vaccine preservative-free 0.5 mL IM (Fluzone, Afluria, Fluarix, Flulaval)       History of Present Illness     HPI  Delia Sarahi MurilloReyes is a 43 y.o. female  who presented to the office today for medication refills of her chronic medications.  Patient reports that she is currently undergoing treatment for a right shoulder injury at work.  She is taking cyclobenzaprine at bedtime, and naproxen 500 mg as needed    She is currently taking Iron, Senokot and Pantoprazole.    The following portions of the patient's history were reviewed and updated as appropriate: allergies, current medications, past family history, past medical history, past social history, past surgical history and problem  "list.    History obtained from : patient  Review of Systems   Constitutional:  Negative for chills and fever.   HENT:  Negative for congestion, rhinorrhea and sore throat.    Respiratory:  Negative for cough and shortness of breath.    Cardiovascular:  Negative for chest pain.   Gastrointestinal:  Positive for abdominal pain. Negative for diarrhea, nausea and vomiting.   Musculoskeletal:         + shoulder pain   Skin:  Negative for rash.   Neurological:  Negative for dizziness and headaches.   Psychiatric/Behavioral:  The patient is nervous/anxious.            Objective     /84 (BP Location: Left arm, Patient Position: Sitting, Cuff Size: Standard)   Pulse 79   Temp 97.6 °F (36.4 °C) (Temporal)   Resp 20   Ht 5' 3\" (1.6 m)   Wt 98.4 kg (217 lb)   LMP 09/11/2024 (Approximate)   SpO2 99%   BMI 38.44 kg/m²     Physical Exam  Vitals and nursing note reviewed.   Constitutional:       Appearance: Normal appearance.   HENT:      Head: Normocephalic and atraumatic.      Mouth/Throat:      Mouth: Mucous membranes are moist.   Cardiovascular:      Rate and Rhythm: Normal rate.   Pulmonary:      Effort: Pulmonary effort is normal.   Neurological:      Mental Status: She is alert and oriented to person, place, and time.   Psychiatric:         Mood and Affect: Mood normal.         Behavior: Behavior normal.         "

## 2024-09-25 ENCOUNTER — TELEPHONE (OUTPATIENT)
Age: 44
End: 2024-09-25

## 2024-09-25 NOTE — TELEPHONE ENCOUNTER
Contacted Pt. in regards to ROUTINE Referral, pt answered and requested a , will have someone return call.

## 2024-10-21 DIAGNOSIS — D50.9 IRON DEFICIENCY ANEMIA, UNSPECIFIED IRON DEFICIENCY ANEMIA TYPE: ICD-10-CM

## 2024-10-22 RX ORDER — FERROUS SULFATE 324(65)MG
324 TABLET, DELAYED RELEASE (ENTERIC COATED) ORAL
Qty: 180 TABLET | Refills: 1 | Status: SHIPPED | OUTPATIENT
Start: 2024-10-22

## 2024-11-14 ENCOUNTER — OFFICE VISIT (OUTPATIENT)
Dept: DENTISTRY | Facility: CLINIC | Age: 44
End: 2024-11-14

## 2024-11-14 VITALS — DIASTOLIC BLOOD PRESSURE: 81 MMHG | SYSTOLIC BLOOD PRESSURE: 119 MMHG | TEMPERATURE: 99 F | HEART RATE: 78 BPM

## 2024-11-14 DIAGNOSIS — K04.7 ABSCESS OF APEX OF DENTAL ROOT COMPLICATING CHRONIC INFLAMMATION: ICD-10-CM

## 2024-11-14 DIAGNOSIS — K02.9 COMPLEX DENTAL CARIES: ICD-10-CM

## 2024-11-14 DIAGNOSIS — Z01.20 ENCOUNTER FOR DENTAL EXAMINATION: Primary | ICD-10-CM

## 2024-11-14 PROCEDURE — D0220 INTRAORAL - PERIAPICAL FIRST RADIOGRAPHIC IMAGE: HCPCS | Performed by: DENTIST

## 2024-11-14 PROCEDURE — D0140 LIMITED ORAL EVALUATION - PROBLEM FOCUSED: HCPCS | Performed by: DENTIST

## 2024-11-14 RX ORDER — AMOXICILLIN 500 MG/1
500 CAPSULE ORAL EVERY 8 HOURS SCHEDULED
Qty: 21 CAPSULE | Refills: 0 | Status: SHIPPED | OUTPATIENT
Start: 2024-11-14 | End: 2024-11-21

## 2024-11-14 NOTE — PROGRESS NOTES
Dental procedures in this visit     - LIMITED ORAL EVALUATION - PROBLEM FOCUSED     - INTRAORAL - PERIAPICAL FIRST RADIOGRAPHIC IMAGE 30,32     Subjective   Patient ID: Delia Sarahi MurilloReyes is a 43 y.o. female.  Chief Complaint   Patient presents with    Dental Filling    Emergency/limited Exam   Pain Level: no pain today but when it is painful the level is 10/10  HPI: patient reported intermittent moderate to severe throbbing pain of lower right back teeth for the last year. Patient reported she was here before and referred her to oral surgeon for extraction of first molar (#30) but now both the two molars on bottom right side are hurting and causing her issues. Patient reported she didn't go to oral surgeon because she didn't have dental insurance at that time. Patient reported she went to ER two months ago and they prescribed her pain and antibiotic medications.   Dental History: patient was seen by other provider on 3/30/2023 for dental emergency limited exam and was referred to [facility] for extraction of tooth #30.     Radiograph: PA x-rays are taken for teeth #30,32.   Limited Exam/Assessment/Plan:   1- Tooth #30 existing defective occlusal composite filling with recurrent caries. PARL is evident. 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 Degree III mobility. Palpation and percussion (+++). Non restorable. Poor prognosis. Recommended extractions.  2- Tooth #31 is missing.  3- tooth #32: Broken MB cusp with extensive deep caries. Tooth is drifted mesially. PARL is evident. Periapical dental abscess with buccal fistula. Degree II mobility. Palpation and percussion (++). Non restorable. Poor prognosis. Recommended extractions.  Referral: STAT to [facility] for extractions of teeth #30 and #32.  RX: Amoxacillin 500 mg, 21 caps, TID, for 7 days, no refill. Patient denies allergy.   POI is given. Reviewed oral hygiene and need for recall visits.   Patient left alert and ambulatory.   NV: COMP and FMX.

## 2024-11-15 ENCOUNTER — TELEPHONE (OUTPATIENT)
Dept: DENTISTRY | Facility: CLINIC | Age: 44
End: 2024-11-15

## 2024-12-06 ENCOUNTER — OFFICE VISIT (OUTPATIENT)
Dept: DENTISTRY | Facility: CLINIC | Age: 44
End: 2024-12-06

## 2024-12-06 VITALS — DIASTOLIC BLOOD PRESSURE: 80 MMHG | SYSTOLIC BLOOD PRESSURE: 115 MMHG | HEART RATE: 80 BPM

## 2024-12-06 DIAGNOSIS — Z01.20 ENCOUNTER FOR DENTAL EXAMINATION: Primary | ICD-10-CM

## 2024-12-06 PROCEDURE — D0150 COMPREHENSIVE ORAL EVALUATION - NEW OR ESTABLISHED PATIENT: HCPCS

## 2024-12-06 PROCEDURE — D0210 INTRAORAL - COMPLETE SERIES OF RADIOGRAPHIC IMAGES: HCPCS

## 2024-12-06 NOTE — PROGRESS NOTES
Comprehensive Exam    Delia Sarahi MurilloReyes 43 y.o. female presents with daughter to [facility] for comprehensive exam.  PMH reviewed, no changes, ASA I. Significant medical history: NSF. Significant allergies: NKA. Significant medications: NSF.  Pain level 0/10    Chief complaint:   I'm interested in a cleaning and closing my orthodontic spaces    Consent:  Reviewed procedures involved with comprehensive exam including radiographs, oral exam, and periodontal probing.   Patient understands and consent was given by self via verbal consent.    Radiographs: FMX.    Oral cancer screening: normal.  Extraoral exam: no remarkable findings.  Intraoral exam:  maxillary peyton, mandibular peyton .    Periodontal exam:  Hygiene - Fair.  Plaque - Moderate.  Horizontal bone loss -  UR: None  UL: None.  LL: Mild (<15%).  LR: Mild (<15%).  Vertical bone loss - None.  Subgingival calculus - Localized.  BOP - Localized.  Mobility - #23, 24, 25, 26 Grade 1 .  Furcation involvements - None.  Occlusal trauma - None.  Smoker - No.  Diabetic - No.  Periodontal Stage: Reduced periodontium with moderate gingivitis  Periodontal Grade: B.  Periodontal Plan: Prophy.    Caries exam:   Caries detected: #3, 14, 15   Teeth with elevated chance of needing RCT: None  Likely nonrestorable teeth: #2    Occlusal assessment:  VDO / restorative space - Normal.  AP Classification -  Right: Canine Class I;   Left: Canine Class I;  Overbite - 0%.  Overjet -  0 mm.  Supra-eruptions or drifting -  buccal flaring of #15 .  Crossbites - None.  Recommended for orthodontic referral? Yes.  Recommended for orthodontic consult at [place]? Yes.    Other remarkable findings:  NSF    Tx plan:  Tx plan able to be fully determined at comp exam..  Prophy  #3OL resin& ext retained #2  #14OL resin, #15DO (can visualize distal), Option presented to ext #15 instead due to angulation and no opposing dentition, pt deferred.   Pt is interested in closing mandibular spacing between  #23-26 with ortho. Explained to patient risks of further bone loss with orthodontics but will still plan for a consult so patient can evaluate options. Closing spaces with restorations would not be good esthetic result.   Pt is not interested in replacement of mandibular molars at this time, explained to patient replacement options include implants or RPD but advised that due to keeley peyton RPD may be uncomfortable unless peyton removed. Pt says she understands & will consider replacement.     Referral(s): None needed.  Rx: None.  Recommended recall schedule: 6 months.    Patient dismissed ambulatory and alert.    NV: Prophy or resin/ext #2.    Attending:  Dr. Bianchi  checked radiographs & reviewed tx plan .

## 2025-01-13 ENCOUNTER — OFFICE VISIT (OUTPATIENT)
Dept: DENTISTRY | Facility: CLINIC | Age: 45
End: 2025-01-13

## 2025-01-13 VITALS — SYSTOLIC BLOOD PRESSURE: 109 MMHG | HEART RATE: 96 BPM | TEMPERATURE: 98.1 F | DIASTOLIC BLOOD PRESSURE: 71 MMHG

## 2025-01-13 DIAGNOSIS — Z01.21 ENCOUNTER FOR DENTAL EXAMINATION AND CLEANING WITH ABNORMAL FINDINGS: Primary | ICD-10-CM

## 2025-01-13 PROCEDURE — D1330 ORAL HYGIENE INSTRUCTIONS: HCPCS | Performed by: DENTAL HYGIENIST

## 2025-01-13 PROCEDURE — D1110 PROPHYLAXIS - ADULT: HCPCS | Performed by: DENTAL HYGIENIST

## 2025-01-13 NOTE — PROGRESS NOTES
Adult Prophy    ASA I  Pain:  0  Reviewed M/DH     Type of Treatment:  Adult Prophy - used Ultrasonic and Hand scaling,  Polished, Flossed.   Reviewed OHI  Brush:  2X/day and Floss 1X/day.  Recommended Listerine  / ACT  mouth rinses.     EO/OCS Exams:  No significant findings  IO: No significant findings  Oral Hygiene:  Fair  /Poor  Plaque:  Light   Calculus:  Light  / Moderate   Bleeding:  Light   Gingiva:    Red   Stain:  Light    Perio Charting:  Periocharting recently completed.    Perio Findings:  Mild bone loss and recession  Caries Findings:  see tooth chart  Caries Risk Assessment:   mod caries risk    Treatment Plan:  Updated    Exam:  none  Referral:  No referral given   NV1:  6mrc - 50 min --see appts and reschedule the one in 3/ 2025

## 2025-01-23 ENCOUNTER — OFFICE VISIT (OUTPATIENT)
Dept: DENTISTRY | Facility: CLINIC | Age: 45
End: 2025-01-23

## 2025-01-23 VITALS — HEART RATE: 93 BPM | DIASTOLIC BLOOD PRESSURE: 85 MMHG | SYSTOLIC BLOOD PRESSURE: 137 MMHG

## 2025-01-23 DIAGNOSIS — K02.62 CARIES OF DENTIN: Primary | ICD-10-CM

## 2025-01-23 PROCEDURE — D2392 RESIN-BASED COMPOSITE - 2 SURFACES, POSTERIOR: HCPCS

## 2025-01-24 ENCOUNTER — CLINICAL SUPPORT (OUTPATIENT)
Dept: OBGYN CLINIC | Facility: CLINIC | Age: 45
End: 2025-01-24

## 2025-01-24 VITALS
BODY MASS INDEX: 36.32 KG/M2 | HEIGHT: 65 IN | DIASTOLIC BLOOD PRESSURE: 70 MMHG | WEIGHT: 218 LBS | SYSTOLIC BLOOD PRESSURE: 118 MMHG

## 2025-01-24 DIAGNOSIS — Z23 NEED FOR HPV VACCINE: Primary | ICD-10-CM

## 2025-01-24 PROCEDURE — 90471 IMMUNIZATION ADMIN: CPT

## 2025-01-24 PROCEDURE — 90651 9VHPV VACCINE 2/3 DOSE IM: CPT

## 2025-01-24 NOTE — PROGRESS NOTES
Patient given 3rd HPV on left deltoid on 1/24/25    NDC# 9859-1168-90  LOT# [id number]  EXP# [id number]

## 2025-01-24 NOTE — PROGRESS NOTES
Composite Restoration #3 OL    Delia Sarahi MurilloReyes 44 y.o. female presents with self to [facility] for composite restoration  PMH reviewed, no changes, ASA II. Significant medical history: Iron deficiency anemia, abdominal pain, and class I obesity. Significant allergies: NKDA. Significant medications: Ferrous sulfate and pantoprazole.    Diagnosis:  Caries #3 OL - Toward middle thirds of dentin    Prognosis:  good    Consent:  Risks of specific procedure: need for RCT if pulp exposure occurs or in future if pulp is inflamed, need to revise tx plan based on extent of decay, damage to adjacent tooth and/or restoration.  Risks of any dental procedure: post procedural pain or sensitivity, local anesthetic side effects, allergic reaction to dental materials and medications, breakage of local anesthetic needle, aspiration of small dental tools, injury to nearby hard and soft tissues and anatomical structures.  Benefits: Prevent further breakdown of tooth and its sequelae.  Alternatives: No tx.  Tx plan for composite restoration #3 OL reviewed. Opportunity to ask questions given, all questions answered to degree of medical and dental certainty.  Patient understands and consent given by self via verbal consent.    Anesthesia:  Topical 20% benzocaine.  1 carps 2% Lidocaine 1:100k epi via buccal infiltration.    Procedure details:  Isolation: cotton rolls and high volume suction  Prepped tooth #3 OL with high speed handpiece.  Caries removed with round carbide on slow speed.  Band placement: not applicable/needed for this restoration.   Etch with 37% H2PO4 15 seconds. Rinsed and suctioned.  Applied  with 20 second scrub, air dried, and light cured.  Restored with packable (A2 shade) and light cured.  Checked occlusion and adjusted with finishing burs.  Polished with white stone burs.  Verified occlusion and contacts.    Patient dismissed ambulatory and alert.    NV: 4/10/25 for more resins.    Attending:   Kevin was present in clinic.

## 2025-02-21 ENCOUNTER — TELEPHONE (OUTPATIENT)
Age: 45
End: 2025-02-21

## 2025-02-21 NOTE — TELEPHONE ENCOUNTER
Patient on wait list for talk therapy services. Writer reached out to verify if Pt is still interested in service, and if would like to remain on [facility]. Spoke to Pt, Pt states her current Insurance is [employer]- Medicaid. Writer informed Pt insurance is OON. Pt would like a packet with outside resources to be mailed out.

## 2025-04-18 DIAGNOSIS — D50.9 IRON DEFICIENCY ANEMIA, UNSPECIFIED IRON DEFICIENCY ANEMIA TYPE: ICD-10-CM

## 2025-04-18 RX ORDER — FERROUS SULFATE 324(65)MG
324 TABLET, DELAYED RELEASE (ENTERIC COATED) ORAL
Qty: 180 TABLET | Refills: 1 | Status: SHIPPED | OUTPATIENT
Start: 2025-04-18

## 2025-05-01 ENCOUNTER — OFFICE VISIT (OUTPATIENT)
Dept: FAMILY MEDICINE CLINIC | Facility: CLINIC | Age: 45
End: 2025-05-01

## 2025-05-01 VITALS
DIASTOLIC BLOOD PRESSURE: 70 MMHG | WEIGHT: 219 LBS | SYSTOLIC BLOOD PRESSURE: 130 MMHG | TEMPERATURE: 98.2 F | HEART RATE: 103 BPM | RESPIRATION RATE: 18 BRPM | HEIGHT: 64 IN | OXYGEN SATURATION: 98 % | BODY MASS INDEX: 37.39 KG/M2

## 2025-05-01 DIAGNOSIS — R10.13 EPIGASTRIC PAIN: ICD-10-CM

## 2025-05-01 DIAGNOSIS — E66.811 CLASS 1 OBESITY DUE TO EXCESS CALORIES WITHOUT SERIOUS COMORBIDITY WITH BODY MASS INDEX (BMI) OF 34.0 TO 34.9 IN ADULT: ICD-10-CM

## 2025-05-01 DIAGNOSIS — F41.9 ANXIETY: Primary | ICD-10-CM

## 2025-05-01 DIAGNOSIS — K59.00 CONSTIPATION, UNSPECIFIED CONSTIPATION TYPE: ICD-10-CM

## 2025-05-01 DIAGNOSIS — E66.09 CLASS 1 OBESITY DUE TO EXCESS CALORIES WITHOUT SERIOUS COMORBIDITY WITH BODY MASS INDEX (BMI) OF 34.0 TO 34.9 IN ADULT: ICD-10-CM

## 2025-05-01 DIAGNOSIS — D50.8 OTHER IRON DEFICIENCY ANEMIA: ICD-10-CM

## 2025-05-01 RX ORDER — PANTOPRAZOLE SODIUM 40 MG/1
40 TABLET, DELAYED RELEASE ORAL DAILY
Qty: 90 TABLET | Refills: 1 | Status: SHIPPED | OUTPATIENT
Start: 2025-05-01

## 2025-05-01 RX ORDER — SENNOSIDES A AND B 8.6 MG/1
1 TABLET, FILM COATED ORAL DAILY
Qty: 90 TABLET | Refills: 1 | Status: SHIPPED | OUTPATIENT
Start: 2025-05-01

## 2025-05-01 NOTE — PROGRESS NOTES
Name: Delia Sarahi MurilloReyes      : 1980      MRN: [id number]  Encounter Provider: Anai Sullivan MD  Encounter Date: 2025   Encounter department: [facility]  :  Assessment & Plan  Anxiety  TROY- 7 : 14 ( moderate)   NO SI/HI   Past history for 10 years, took medication 8 years ago which was helping her a bit.   She was able to control it but now it is getting harder to control. Patient has been eating more healthy, but is still gaining weight.   No formal exercise, but previously walked in the part and went to the gym, barriers to exercise include getting home late around 7:30.     Plan:   Encouraged patient to increase exercise and keep a schedule to exercise about 30 minutes per day  Mental health services provided in [facility]   FU in 1 month            Class 1 obesity due to excess calories without serious comorbidity with body mass index (BMI) of 34.0 to 34.9 in adult  No formal exercise currently, but previously went to gym and walked in the park.     Plan:   Nutrition consult   Patient instructed to decrease consumption of fried/process/ fast foods and increase whole food intake   Goal of 3-5 servings of vegetables per day   Portion control   Goal of exercise 150 minutes per week of exercise, or 30 minute of brisk exercise 5x/ week but increase activity slowly (SMART goal)  to get to goal.   FU in 1 month       Orders:    Ambulatory Referral to Nutrition Services; Future    Other iron deficiency anemia  Lab Results   Component Value Date    WBC 8.93 2024    HGB 10.2 (L) 2024    HCT 35.4 2024    MCV 80 (L) 2024     2024     :[id number]      Orders:    CBC and differential; Future    Epigastric pain    Orders:    pantoprazole (PROTONIX) 40 mg tablet; Take 1 tablet (40 mg total) by mouth daily    Constipation, unspecified constipation type    Orders:    senna (SENOKOT) 8.6 MG tablet; Take 1 tablet (8.6 mg total) by mouth  "daily        BMI Counseling: Body mass index is 37.59 kg/m². The BMI is above normal. Nutrition recommendations include encouraging healthy choices of fruits and vegetables and reducing intake of saturated and trans fat. Exercise recommendations include moderate physical activity 150 minutes/week. Rationale for BMI follow-up plan is due to patient being overweight or obese.       History of Present Illness   HPI  Patient is seen today for a same day visit for anxiety. She reports that her anxiety is affecting her weight because when she is anxious she will eat more and is gaining weight. She has a history of anxiety of more than 10 years but has not been on any medications for more than 8 years.  She is eating well, more healthy but does not do any exercise because of she gets home late and just wants to rest. Her family is in her home country and she worries about them a lot especially her sons. Her father has passed away recently and she feels that is making her feel more anxious. She has a history of taking medications for  anxiety, a medication for sleep and a separate medication for depression that all affected her stomach poorly, therefore stopped them. She denies any depressive feelings, or SI. Interested in non medical management of weight and anxiety.      Review of Systems   Constitutional:  Negative for chills and fever.   Respiratory:  Negative for shortness of breath.    Cardiovascular:  Negative for chest pain.   Gastrointestinal:  Positive for abdominal pain. Negative for constipation, diarrhea, nausea and vomiting.   Skin:  Negative for rash.   Neurological:  Negative for dizziness, weakness and light-headedness.   Psychiatric/Behavioral:  Negative for agitation and behavioral problems. The patient is nervous/anxious.        Objective   /70 (BP Location: Right arm, Patient Position: Sitting, Cuff Size: Large)   Pulse 103   Temp 98.2 °F (36.8 °C) (Temporal)   Resp 18   Ht 5' 4\" (1.626 m)   " Wt 99.3 kg (219 lb)   LMP 03/18/2025   SpO2 98%   Breastfeeding No   BMI 37.59 kg/m²      Physical Exam  Constitutional:       General: She is not in acute distress.     Appearance: Normal appearance. She is not ill-appearing or toxic-appearing.   HENT:      Head: Normocephalic and atraumatic.      Right Ear: External ear normal.      Left Ear: External ear normal.      Nose: Nose normal.      Mouth/Throat:      Mouth: Mucous membranes are moist.      Pharynx: No oropharyngeal exudate.   Eyes:      General: No scleral icterus.        Right eye: No discharge.         Left eye: No discharge.      Extraocular Movements: Extraocular movements intact.      Conjunctiva/sclera: Conjunctivae normal.   Cardiovascular:      Rate and Rhythm: Normal rate and regular rhythm.      Pulses: Normal pulses.      Heart sounds: Normal heart sounds.   Pulmonary:      Effort: Pulmonary effort is normal.      Breath sounds: Normal breath sounds.   Abdominal:      General: Bowel sounds are normal.      Palpations: Abdomen is soft.      Tenderness: There is no abdominal tenderness.   Musculoskeletal:      Cervical back: Normal range of motion.   Skin:     General: Skin is warm.      Capillary Refill: Capillary refill takes less than 2 seconds.   Neurological:      General: No focal deficit present.      Mental Status: She is alert and oriented to person, place, and time.   Psychiatric:         Judgment: Judgment normal.

## 2025-05-01 NOTE — PATIENT INSTRUCTIONS
Outpatient Mental Health Resources    If you would like to be placed on the wait list for services with [facility] you MUST contact intake at [facility] - [phone]    Emergency & Crisis Support    Suicide and Crisis Lifeline: Call or text 988 (Available 24 hours)  Crisis Text Line: Text HOME to 273512 (Available 24/7)  Warm Line: Call [phone] (Confidential mental health support, available Monday-Sunday: 6 AM-10 AM & 4 PM-12 AM)  [facility] Crisis: Call [phone] (For mental health emergencies, or visit your local Emergency Department)  Crime Victims [place]: Call [phone] (24/7 Advocate Hotline, counseling, court & hospital accompaniment, free services)    www.[email or web address] is a resource to find psychotherapy providers, patients can filter therapist search list based on several criteria including language, specialty, gender, insurance, etc. Individuals seeking will need to reach out to perspective providers through information in the directory. You are encouraged to contact multiple providers to given that many providers have a significant wait list for services as well as to find a provider is a good fit for you!   Local Mental Health Services    [facility]  Call [phone]  Website: [email or web address]  Support for mental health conditions. Free services for all    [facility]  [address] [place] [address] [phone]  Services for all ages; Bilingual (English/Yemeni); Accepts Medical Assistance, Medicare and commercial insurance    [facility] [place]  [address] [address] [phone]  Services for all ages; Bilingual (English/Yemeni); Accepts [place] Blue Cross Blue Shield, [employer], Aetna, Optum and Cigna    [facility]  [facility] [place] [address] [phone]  Services for ages 4+. English only; Does NOT accept Medical Assistance (only Commercial Insurance)    [facility]   [address], [place], [place]   [phone]  Services for all ages; English only; Accepts [employer] Blue [employer] Blue Shield, [place] Blue Cross Blue Sheild and Medicare    [facility]  [address] [place] [address]  [phone]  Services for ages 6+. Bilingual (English/Tu[place]uan); Accepts Medical Assistance only    [facility]  [address] [place] [address]  [phone]  Services for ages 5+. Bilingual (English/Tuvaluan); Accepts Medical Assistance    [facility]  [address] [place] [address]  [phone]  Services for ages 14+. Bilingual (English/Tu[place]uan); Accepts Medical Assistance, Medicare, and Commercial Insurance    [facility]  [address] [place] [address]  [phone]  Services for ages 5+. Bilingual (English/Tuvaluan); Accepts Medical Assistance    [facility]  [address] [place] [address]  [phone]  Services for ages 3+. Bilingual (English/Tu[place]uan); Accepts Medical Assistance and Some Commercial Insurance    [facility]  [address] [facility], [place] [address]  [phone]  Services for ages 5+. Bilingual (English/Tuvaluan); Accepts Medical Assistance    [facility]  [address] [facility], [place] [address]  [phone]  Services for ages 6+. Bilingual (English/Tuvaluan); Accepts Medical Assistance and Commercial Insurance    [facility]   [address] [facility] [place] [address]  [phone]  Services for ages 5+. Bilingual (English/Tuvaluan); Accepts Medical Assistance, Medicare and Commercial Insurance     [facility]  [facility], [address], [place] [address]  [phone]  Services for all ages (Therapy); 18+ (Psychiatry); English only; Accepts [employer] Blue Cross, Aetna, [employer], [employer], [facility] (CHIP & commercial insurance)          Please contact your insurance provider for additional information.

## 2025-05-01 NOTE — ASSESSMENT & PLAN NOTE
No formal exercise currently, but previously went to gym and walked in the park.     Plan:   Nutrition consult   Patient instructed to decrease consumption of fried/process/ fast foods and increase whole food intake   Goal of 3-5 servings of vegetables per day   Portion control   Goal of exercise 150 minutes per week of exercise, or 30 minute of brisk exercise 5x/ week but increase activity slowly (SMART goal)  to get to goal.   FU in 1 month       Orders:    Ambulatory Referral to Nutrition Services; Future

## 2025-05-01 NOTE — ASSESSMENT & PLAN NOTE
Lab Results   Component Value Date    WBC 8.93 04/13/2024    HGB 10.2 (L) 04/13/2024    HCT 35.4 04/13/2024    MCV 80 (L) 04/13/2024     04/13/2024     :[id number]      Orders:    CBC and differential; Future

## 2025-06-17 PROBLEM — F41.9 ANXIETY: Status: ACTIVE | Noted: 2025-06-17

## 2025-06-17 NOTE — ASSESSMENT & PLAN NOTE
TROY- 7 : 12 ( moderate) , improved form last score at 12.   NO SI/HI   Took medication in past but does not want medications now.   No formal exercise, but previously walked in the part and went to the gym, barriers to exercise include getting home late around 7:30.          Plan:   Encouraged patient to increase exercise and keep a schedule to exercise about 30 minutes per day  Currently on wait list until about February.

## 2025-06-17 NOTE — PROGRESS NOTES
Name: Delia Sarahi MurilloReyes      : 1980      MRN: [id number]  Encounter Provider: Anai Sullivan MD  Encounter Date: 2025   Encounter department: [facility]  :  Assessment & Plan  Anxiety  TROY- 7 : 12 ( moderate) , improved form last score at 12.   NO SI/HI   Took medication in past but does not want medications now.   No formal exercise, but previously walked in the part and went to the gym, barriers to exercise include getting home late around 7:30.          Plan:   Encouraged patient to increase exercise and keep a schedule to exercise about 30 minutes per day  Currently on wait list until about February.             Epigastric pain  Improved with pantoprazole, continue current regimen    Orders:    pantoprazole (PROTONIX) 40 mg tablet; Take 1 tablet (40 mg total) by mouth daily    Bilateral carpal tunnel syndrome  Positive tinnel and phalen sign.     FU with Orthopedic referral as scheduled.          Hypokalemia    Orders:    Comprehensive metabolic panel    Iron deficiency anemia, unspecified iron deficiency anemia type  Last HBG was 7.7 in ED on 25. Patient had presented for CP, with negative work up   No new C/P episodes since    Patient does have a history of anemia.   No abnormal bleeding or bruising   She admits to not eating eat often. Only a few times a week due to not having a taste for it     Plan:   Increase lean meat intake, and leafy green vegetables which can contain iron   Increase iron to 324 BID.   Recheck CBC   Strict ED precuations   FU in 2 months for CBC                  BMI Counseling: Body mass index is 36.56 kg/m². The BMI is above normal. Nutrition recommendations include encouraging healthy choices of fruits and vegetables and reducing intake of saturated and trans fat. Exercise recommendations include moderate physical activity 150 minutes/week. Rationale for BMI follow-up plan is due to patient being overweight or obese.  "      History of Present Illness   HPI  Delia Sarahi MurilloReyes 44 y.o. with past medical history significant for iron deficiency anemia, obesity, anxiety comes to the clinic for a follow-up on her anxiety.  Used \[facility]\"" [employer] [facility] ( [id number])   Patient was seen in the ED for chest pain  on 5/27/25, with referral to cardiology made.   No new episodes of chest pain. Patient believes it Is related to anxiety as she has felt this before. Not related to exertion. She was found to be hypokalemic as well at the last ED visit. Her hemoglobin was found to be low as well.               Review of Systems   Constitutional:  Negative for chills and fever.   Respiratory:  Negative for shortness of breath.    Cardiovascular:  Negative for chest pain.   Gastrointestinal:  Positive for abdominal pain. Negative for constipation, diarrhea, nausea and vomiting.   Skin:  Negative for rash.   Neurological:  Negative for dizziness, weakness and light-headedness.   Psychiatric/Behavioral:  Negative for agitation and behavioral problems. The patient is nervous/anxious.        Objective   /80 (BP Location: Right arm, Patient Position: Sitting, Cuff Size: Large)   Pulse (!) 111   Temp 98.7 °F (37.1 °C) (Temporal)   Resp 18   Ht 5' 4\" (1.626 m)   Wt 96.6 kg (213 lb)   LMP 06/17/2025   SpO2 98%   Breastfeeding No   BMI 36.56 kg/m²      Physical Exam  Constitutional:       General: She is not in acute distress.     Appearance: Normal appearance. She is not ill-appearing or toxic-appearing.   HENT:      Head: Normocephalic and atraumatic.      Comments: Pale-pink palpebral fissures     Right Ear: External ear normal.      Left Ear: External ear normal.      Nose: Nose normal.      Mouth/Throat:      Mouth: Mucous membranes are moist.      Pharynx: No oropharyngeal exudate.     Eyes:      General: No scleral icterus.        Right eye: No discharge.         Left eye: No discharge.      Extraocular Movements: " Extraocular movements intact.      Conjunctiva/sclera: Conjunctivae normal.       Cardiovascular:      Rate and Rhythm: Normal rate and regular rhythm.      Pulses: Normal pulses.      Heart sounds: Normal heart sounds.   Pulmonary:      Effort: Pulmonary effort is normal.      Breath sounds: Normal breath sounds.   Abdominal:      General: Bowel sounds are normal.      Palpations: Abdomen is soft. There is no mass.      Tenderness: There is no abdominal tenderness. There is no guarding or rebound.      Hernia: No hernia is present.     Musculoskeletal:      Cervical back: Normal range of motion.     Skin:     General: Skin is warm.      Capillary Refill: Capillary refill takes less than 2 seconds.     Neurological:      General: No focal deficit present.      Mental Status: She is alert and oriented to person, place, and time.     Psychiatric:         Judgment: Judgment normal.

## 2025-06-18 ENCOUNTER — OFFICE VISIT (OUTPATIENT)
Dept: FAMILY MEDICINE CLINIC | Facility: CLINIC | Age: 45
End: 2025-06-18

## 2025-06-18 VITALS
TEMPERATURE: 98.7 F | RESPIRATION RATE: 18 BRPM | DIASTOLIC BLOOD PRESSURE: 80 MMHG | HEART RATE: 111 BPM | SYSTOLIC BLOOD PRESSURE: 118 MMHG | BODY MASS INDEX: 36.37 KG/M2 | WEIGHT: 213 LBS | OXYGEN SATURATION: 98 % | HEIGHT: 64 IN

## 2025-06-18 DIAGNOSIS — R10.13 EPIGASTRIC PAIN: ICD-10-CM

## 2025-06-18 DIAGNOSIS — G56.03 BILATERAL CARPAL TUNNEL SYNDROME: ICD-10-CM

## 2025-06-18 DIAGNOSIS — D50.9 IRON DEFICIENCY ANEMIA, UNSPECIFIED IRON DEFICIENCY ANEMIA TYPE: ICD-10-CM

## 2025-06-18 DIAGNOSIS — E87.6 HYPOKALEMIA: ICD-10-CM

## 2025-06-18 DIAGNOSIS — F41.9 ANXIETY: Primary | ICD-10-CM

## 2025-06-18 PROBLEM — D64.9 ANEMIA, UNSPECIFIED: Status: ACTIVE | Noted: 2025-06-18

## 2025-06-18 PROCEDURE — 99213 OFFICE O/P EST LOW 20 MIN: CPT | Performed by: FAMILY MEDICINE

## 2025-06-18 RX ORDER — PANTOPRAZOLE SODIUM 40 MG/1
40 TABLET, DELAYED RELEASE ORAL DAILY
Qty: 90 TABLET | Refills: 1 | Status: SHIPPED | OUTPATIENT
Start: 2025-06-18

## 2025-06-18 NOTE — ASSESSMENT & PLAN NOTE
Lab Results   Component Value Date    WBC 8.93 04/13/2024    HGB 10.2 (L) 04/13/2024    HCT 35.4 04/13/2024    MCV 80 (L) 04/13/2024     04/13/2024   Last hemoglobin was 7.7   Does not often eat meat.  Heavy periods for the past 8 years or more     Plan:   recheck CBC in 3 months   Increase meat intake if possible   Continue 324 mg BID

## 2025-06-18 NOTE — ASSESSMENT & PLAN NOTE
Improved with pantoprazole, continue current regimen    Orders:    pantoprazole (PROTONIX) 40 mg tablet; Take 1 tablet (40 mg total) by mouth daily

## 2025-06-20 NOTE — ASSESSMENT & PLAN NOTE
Last HBG was 7.7 in ED on 5/7/25. Patient had presented for CP, with negative work up   No new C/P episodes since    Patient does have a history of anemia.   No abnormal bleeding or bruising   She admits to not eating eat often. Only a few times a week due to not having a taste for it     Plan:   Increase lean meat intake, and leafy green vegetables which can contain iron   Increase iron to 324 BID.   Recheck CBC   Strict ED precuations   FU in 2 months for CBC

## 2025-07-14 ENCOUNTER — OFFICE VISIT (OUTPATIENT)
Dept: DENTISTRY | Facility: CLINIC | Age: 45
End: 2025-07-14

## 2025-07-14 VITALS — SYSTOLIC BLOOD PRESSURE: 112 MMHG | DIASTOLIC BLOOD PRESSURE: 70 MMHG | HEART RATE: 73 BPM

## 2025-07-14 DIAGNOSIS — Z01.20 ENCOUNTER FOR DENTAL EXAMINATION: Primary | ICD-10-CM

## 2025-07-14 PROCEDURE — D1330 ORAL HYGIENE INSTRUCTIONS: HCPCS | Performed by: DENTAL HYGIENIST

## 2025-07-14 PROCEDURE — D0120 PERIODIC ORAL EVALUATION - ESTABLISHED PATIENT: HCPCS | Performed by: DENTIST

## 2025-07-14 PROCEDURE — D1110 PROPHYLAXIS - ADULT: HCPCS | Performed by: DENTAL HYGIENIST

## 2025-07-14 PROCEDURE — D0220 INTRAORAL - PERIAPICAL FIRST RADIOGRAPHIC IMAGE: HCPCS | Performed by: DENTAL HYGIENIST

## 2025-07-14 NOTE — PROGRESS NOTES
PERIODIC EXAM, ADULT PROPHY , 1 PA #2   REVIEWED MED HX: meds, allergies, health changes reviewed in [facility]. All consents signed.  CHIEF CONCERN: no dental pain or concerns  PAIN SCALE:  0  ASA CLASS:  I  PLAQUE:  mild  CALCULUS:  light  BLEEDING:   light  STAIN :   none      PERIO:  Mild bone loss and recession    I-PAD Turkish translation -  # [id number] - 15 min    Hygiene Procedures:  Scaled, Polished, Flossed and Used Cavitron    Oral Hygiene Instruction: Brushing Minimum 2x daily for 2 minutes, daily flossing, Listerine, and Recommended soft toothbrush only    Dispensed: Toothbrush, Toothpaste, Floss    Visual and Tactile Intraoral/ Extraoral evaluation: Oral and Oropharyngeal cancer evaluation. No findings     Dr. GUERO Willson   Reviewed with patient clinical and radiographic findings and patient verbalized understanding. All questions and concerns addressed.     REFERRALS: none    CARIES FINDINGS: see tooth chart  ---#2 root tip still there - took PA to confirm - eval whether ext can be done here or need to refer  ---tried to take PA #15 to check distal but pt very gaggy today - was unable to get all the way back.  Determine whether able to restore at restorative visit.       TREATMENT  PLAN :   NV1:  Rest 14 - OL, 15  - DO - 60 min  NV2:  6mrc w/ Bws - 50 min    Last BWX:   12/6/24  Last Panorex:   Last FMX :   12/6/24